# Patient Record
Sex: MALE | Race: WHITE | Employment: OTHER | URBAN - METROPOLITAN AREA
[De-identification: names, ages, dates, MRNs, and addresses within clinical notes are randomized per-mention and may not be internally consistent; named-entity substitution may affect disease eponyms.]

---

## 2017-01-19 ENCOUNTER — OFFICE VISIT (OUTPATIENT)
Dept: FAMILY MEDICINE CLINIC | Age: 72
End: 2017-01-19

## 2017-01-19 VITALS
WEIGHT: 194.4 LBS | BODY MASS INDEX: 25.76 KG/M2 | SYSTOLIC BLOOD PRESSURE: 120 MMHG | DIASTOLIC BLOOD PRESSURE: 70 MMHG | HEIGHT: 73 IN

## 2017-01-19 DIAGNOSIS — E78.2 MIXED HYPERLIPIDEMIA: ICD-10-CM

## 2017-01-19 DIAGNOSIS — Z00.00 HEALTHCARE MAINTENANCE: ICD-10-CM

## 2017-01-19 DIAGNOSIS — R00.2 PALPITATIONS: Primary | ICD-10-CM

## 2017-01-19 PROCEDURE — 4040F PNEUMOC VAC/ADMIN/RCVD: CPT | Performed by: FAMILY MEDICINE

## 2017-01-19 PROCEDURE — 3017F COLORECTAL CA SCREEN DOC REV: CPT | Performed by: FAMILY MEDICINE

## 2017-01-19 PROCEDURE — G8420 CALC BMI NORM PARAMETERS: HCPCS | Performed by: FAMILY MEDICINE

## 2017-01-19 PROCEDURE — G8484 FLU IMMUNIZE NO ADMIN: HCPCS | Performed by: FAMILY MEDICINE

## 2017-01-19 PROCEDURE — G8427 DOCREV CUR MEDS BY ELIG CLIN: HCPCS | Performed by: FAMILY MEDICINE

## 2017-01-19 PROCEDURE — 1036F TOBACCO NON-USER: CPT | Performed by: FAMILY MEDICINE

## 2017-01-19 PROCEDURE — 1123F ACP DISCUSS/DSCN MKR DOCD: CPT | Performed by: FAMILY MEDICINE

## 2017-01-19 PROCEDURE — 99213 OFFICE O/P EST LOW 20 MIN: CPT | Performed by: FAMILY MEDICINE

## 2017-01-19 RX ORDER — METOPROLOL TARTRATE 50 MG/1
TABLET, FILM COATED ORAL
Qty: 90 TABLET | Refills: 3 | Status: SHIPPED | OUTPATIENT
Start: 2017-01-19 | End: 2018-01-22 | Stop reason: SDUPTHER

## 2017-01-19 ASSESSMENT — ENCOUNTER SYMPTOMS
BLOOD IN STOOL: 0
TROUBLE SWALLOWING: 0
SORE THROAT: 0
DIARRHEA: 0
CONSTIPATION: 0
WHEEZING: 0
ABDOMINAL PAIN: 0
SHORTNESS OF BREATH: 0
CHEST TIGHTNESS: 0

## 2017-01-19 ASSESSMENT — PATIENT HEALTH QUESTIONNAIRE - PHQ9
1. LITTLE INTEREST OR PLEASURE IN DOING THINGS: 1
SUM OF ALL RESPONSES TO PHQ QUESTIONS 1-9: 2
SUM OF ALL RESPONSES TO PHQ9 QUESTIONS 1 & 2: 2
2. FEELING DOWN, DEPRESSED OR HOPELESS: 1

## 2018-01-04 ENCOUNTER — TELEPHONE (OUTPATIENT)
Dept: FAMILY MEDICINE CLINIC | Age: 73
End: 2018-01-04

## 2018-01-17 ENCOUNTER — NURSE ONLY (OUTPATIENT)
Dept: FAMILY MEDICINE CLINIC | Age: 73
End: 2018-01-17

## 2018-01-17 DIAGNOSIS — Z00.00 ANNUAL PHYSICAL EXAM: Primary | ICD-10-CM

## 2018-01-17 LAB
A/G RATIO: 1.7 (ref 1.1–2.2)
ALBUMIN SERPL-MCNC: 4.4 G/DL (ref 3.4–5)
ALP BLD-CCNC: 60 U/L (ref 40–129)
ALT SERPL-CCNC: 14 U/L (ref 10–40)
ANION GAP SERPL CALCULATED.3IONS-SCNC: 11 MMOL/L (ref 3–16)
AST SERPL-CCNC: 17 U/L (ref 15–37)
BILIRUB SERPL-MCNC: 0.8 MG/DL (ref 0–1)
BILIRUBIN, POC: NORMAL
BLOOD URINE, POC: NORMAL
BUN BLDV-MCNC: 16 MG/DL (ref 7–20)
CALCIUM SERPL-MCNC: 9.4 MG/DL (ref 8.3–10.6)
CHLORIDE BLD-SCNC: 100 MMOL/L (ref 99–110)
CHOLESTEROL, TOTAL: 217 MG/DL (ref 0–199)
CLARITY, POC: NORMAL
CO2: 29 MMOL/L (ref 21–32)
COLOR, POC: NORMAL
CREAT SERPL-MCNC: 1 MG/DL (ref 0.8–1.3)
GFR AFRICAN AMERICAN: >60
GFR NON-AFRICAN AMERICAN: >60
GLOBULIN: 2.6 G/DL
GLUCOSE BLD-MCNC: 90 MG/DL (ref 70–99)
GLUCOSE URINE, POC: NORMAL
HCT VFR BLD CALC: 46.5 % (ref 40.5–52.5)
HDLC SERPL-MCNC: 59 MG/DL (ref 40–60)
HEMOGLOBIN: 15.7 G/DL (ref 13.5–17.5)
HEPATITIS C ANTIBODY INTERPRETATION: NORMAL
KETONES, POC: NORMAL
LDL CHOLESTEROL CALCULATED: 138 MG/DL
LEUKOCYTE EST, POC: NORMAL
MCH RBC QN AUTO: 29.8 PG (ref 26–34)
MCHC RBC AUTO-ENTMCNC: 33.8 G/DL (ref 31–36)
MCV RBC AUTO: 88.1 FL (ref 80–100)
NITRITE, POC: NORMAL
PDW BLD-RTO: 13.8 % (ref 12.4–15.4)
PH, POC: 6
PLATELET # BLD: 179 K/UL (ref 135–450)
PMV BLD AUTO: 7.4 FL (ref 5–10.5)
POTASSIUM SERPL-SCNC: 5.3 MMOL/L (ref 3.5–5.1)
PROSTATE SPECIFIC ANTIGEN: 0.6 NG/ML (ref 0–4)
PROTEIN, POC: NORMAL
RBC # BLD: 5.28 M/UL (ref 4.2–5.9)
SODIUM BLD-SCNC: 140 MMOL/L (ref 136–145)
SPECIFIC GRAVITY, POC: 1.02
TOTAL PROTEIN: 7 G/DL (ref 6.4–8.2)
TRIGL SERPL-MCNC: 100 MG/DL (ref 0–150)
UROBILINOGEN, POC: 0.2
VLDLC SERPL CALC-MCNC: 20 MG/DL
WBC # BLD: 4.7 K/UL (ref 4–11)

## 2018-01-17 PROCEDURE — 36415 COLL VENOUS BLD VENIPUNCTURE: CPT | Performed by: FAMILY MEDICINE

## 2018-01-17 PROCEDURE — 81002 URINALYSIS NONAUTO W/O SCOPE: CPT | Performed by: FAMILY MEDICINE

## 2018-01-22 ENCOUNTER — OFFICE VISIT (OUTPATIENT)
Dept: FAMILY MEDICINE CLINIC | Age: 73
End: 2018-01-22

## 2018-01-22 VITALS
WEIGHT: 190 LBS | SYSTOLIC BLOOD PRESSURE: 126 MMHG | DIASTOLIC BLOOD PRESSURE: 76 MMHG | HEIGHT: 73 IN | BODY MASS INDEX: 25.18 KG/M2

## 2018-01-22 DIAGNOSIS — M17.11 PRIMARY OSTEOARTHRITIS OF RIGHT KNEE: ICD-10-CM

## 2018-01-22 DIAGNOSIS — R00.2 PALPITATIONS: ICD-10-CM

## 2018-01-22 DIAGNOSIS — G47.33 OSA (OBSTRUCTIVE SLEEP APNEA): ICD-10-CM

## 2018-01-22 DIAGNOSIS — E78.2 MIXED HYPERLIPIDEMIA: Primary | ICD-10-CM

## 2018-01-22 DIAGNOSIS — Z12.11 SCREEN FOR COLON CANCER: ICD-10-CM

## 2018-01-22 PROCEDURE — 99214 OFFICE O/P EST MOD 30 MIN: CPT | Performed by: FAMILY MEDICINE

## 2018-01-22 RX ORDER — METOPROLOL TARTRATE 50 MG/1
TABLET, FILM COATED ORAL
Qty: 90 TABLET | Refills: 3 | Status: SHIPPED | OUTPATIENT
Start: 2018-01-22 | End: 2019-01-17 | Stop reason: SDUPTHER

## 2018-01-22 ASSESSMENT — PATIENT HEALTH QUESTIONNAIRE - PHQ9
2. FEELING DOWN, DEPRESSED OR HOPELESS: 0
1. LITTLE INTEREST OR PLEASURE IN DOING THINGS: 0
SUM OF ALL RESPONSES TO PHQ QUESTIONS 1-9: 0
SUM OF ALL RESPONSES TO PHQ9 QUESTIONS 1 & 2: 0

## 2018-01-22 ASSESSMENT — ENCOUNTER SYMPTOMS
WHEEZING: 0
DIARRHEA: 0
CONSTIPATION: 0
ABDOMINAL PAIN: 0
SORE THROAT: 0
CHEST TIGHTNESS: 0
TROUBLE SWALLOWING: 0
BLOOD IN STOOL: 0
SHORTNESS OF BREATH: 0

## 2018-01-22 NOTE — PROGRESS NOTES
Subjective:      Patient ID: Ni Thompson is a 81 y.o. male. HPI  Health maintenance exam  Palpitation well controllled aith meds   cont to be stressed with current family stucture       Adopted grandson with some psychosocial issues  Retired   denies chest poain or sob  No abd pain     No colonoscopy yet either    Concerns with fareed    por sleep and no feeling of rested after nnightrs sleep   been concerned for fareed for some time    Min snorring    No obesity    Right knee pain with weight bearing    No recent injury    No Known Allergies  Current Outpatient Prescriptions   Medication Sig      metoprolol tartrate (LOPRESSOR) 50 MG tablet TAKE ONE TABLET BY MOUTH DAILY      neomycin-polymyxin-hydrocortisone (CORTISPORIN) 3.5-57830-5 otic solution Place 3 drops into the left ear 3 times daily      triamcinolone (KENALOG) 0.1 % cream Apply topically 2 times daily. No current facility-administered medications for this visit. No past medical history on file. No past surgical history on file. Social History   Substance Use Topics    Smoking status: Never Smoker    Smokeless tobacco: Never Used    Alcohol use No     No family history on file. Review of Systems   Constitutional: Negative for fatigue and unexpected weight change. HENT: Negative for congestion, postnasal drip, sore throat and trouble swallowing. Eyes: Negative for visual disturbance. Respiratory: Negative for chest tightness, shortness of breath and wheezing. Cardiovascular: Positive for palpitations. Negative for chest pain. Gastrointestinal: Negative for abdominal pain, blood in stool, constipation and diarrhea. Genitourinary: Negative for difficulty urinating, frequency and urgency. Musculoskeletal: Negative for arthralgias and joint swelling. Neurological: Negative for dizziness, weakness and numbness. Hematological: Negative for adenopathy. Does not bruise/bleed easily.    Psychiatric/Behavioral: Negative for sleep disturbance. The patient is nervous/anxious. A complete 14 point  review of systems was completed; pertinent positives are noted in HPI    Vitals:    01/22/18 1003   BP: 126/76   Weight: 190 lb (86.2 kg)   Height: 6' 1\" (1.854 m)     Body mass index is 25.07 kg/m². Wt Readings from Last 3 Encounters:   01/22/18 190 lb (86.2 kg)   01/19/17 194 lb 6.4 oz (88.2 kg)   12/28/15 189 lb (85.7 kg)     BP Readings from Last 3 Encounters:   01/22/18 126/76   01/19/17 120/70   12/28/15 100/60        /76   Ht 6' 1\" (1.854 m)   Wt 190 lb (86.2 kg)   BMI 25.07 kg/m²    Objective:   Physical Exam   Constitutional: He is oriented to person, place, and time. He appears well-developed. No distress. HENT:   Right Ear: External ear normal.   Left Ear: External ear normal.   Nose: Nose normal.   Mouth/Throat: Oropharynx is clear and moist.   Eyes: Conjunctivae are normal. No scleral icterus. Neck: Neck supple. No thyromegaly present. Cardiovascular: Normal rate, regular rhythm, normal heart sounds and intact distal pulses. No murmur heard. Pulmonary/Chest: Effort normal and breath sounds normal. No respiratory distress. Abdominal: Soft. Bowel sounds are normal. There is no tenderness. Musculoskeletal: He exhibits no edema. Right knee medial joint line pain    painwith gapping knee medially as well  No effusion or swwelling   Lymphadenopathy:     He has no cervical adenopathy. Neurological: He is alert and oriented to person, place, and time. Skin: Skin is warm. No rash noted. Psychiatric: He has a normal mood and affect. His behavior is normal. Thought content normal.   Mild anxiousness         Assessment:      Assessment/Plan:  Jose Angel Lynn was seen today for medicare awv.     Diagnoses and all orders for this visit:    Healthcare maintenance, good over all health    Mixed hyperlipidemia, mild    Palpitations, well controlled with lopressor  -     metoprolol tartrate (LOPRESSOR) 50 MG tablet; TAKE ONE TABLET BY MOUTH DAILY    Screen for colon cancer  -     POCT Fecal Immunochemical Test (FIT);  Future    Primary osteoarthritis of right knee  -     XR KNEE RIGHT (3 VIEWS)    PAULINA (obstructive sleep apnea)  -     [facility] -[facility]            Plan:      Reviewed lab results with patient in detail, both normal and abnormal and recommended plan of care  Discussed need for colon cancer screening  Xray knee for djd  Refer sleep studies for poss paulina vs anxiety issues  Cont current meds   rto annually

## 2018-02-08 ENCOUNTER — HOSPITAL ENCOUNTER (OUTPATIENT)
Dept: OTHER | Age: 73
Discharge: HOME | End: 2018-02-08
Attending: FAMILY MEDICINE | Admitting: FAMILY MEDICINE

## 2018-02-08 DIAGNOSIS — M17.11 ARTHRITIS OF KNEE, RIGHT: ICD-10-CM

## 2018-02-08 LAB
CONTROL: NORMAL
HEMOCCULT STL QL: NEGATIVE

## 2018-02-08 PROCEDURE — 82274 ASSAY TEST FOR BLOOD FECAL: CPT | Performed by: FAMILY MEDICINE

## 2019-01-17 ENCOUNTER — OFFICE VISIT (OUTPATIENT)
Dept: FAMILY MEDICINE CLINIC | Age: 74
End: 2019-01-17
Payer: MEDICARE

## 2019-01-17 VITALS
HEIGHT: 73 IN | DIASTOLIC BLOOD PRESSURE: 70 MMHG | WEIGHT: 195.4 LBS | BODY MASS INDEX: 25.9 KG/M2 | SYSTOLIC BLOOD PRESSURE: 112 MMHG

## 2019-01-17 DIAGNOSIS — R00.2 PALPITATIONS: ICD-10-CM

## 2019-01-17 DIAGNOSIS — Z12.11 SCREEN FOR COLON CANCER: ICD-10-CM

## 2019-01-17 DIAGNOSIS — E78.2 MIXED HYPERLIPIDEMIA: Primary | ICD-10-CM

## 2019-01-17 DIAGNOSIS — N40.1 BPH WITH OBSTRUCTION/LOWER URINARY TRACT SYMPTOMS: ICD-10-CM

## 2019-01-17 DIAGNOSIS — N13.8 BPH WITH OBSTRUCTION/LOWER URINARY TRACT SYMPTOMS: ICD-10-CM

## 2019-01-17 LAB
A/G RATIO: 1.8 (ref 1.1–2.2)
ALBUMIN SERPL-MCNC: 4.4 G/DL (ref 3.4–5)
ALP BLD-CCNC: 65 U/L (ref 40–129)
ALT SERPL-CCNC: 11 U/L (ref 10–40)
ANION GAP SERPL CALCULATED.3IONS-SCNC: 16 MMOL/L (ref 3–16)
AST SERPL-CCNC: 16 U/L (ref 15–37)
BILIRUB SERPL-MCNC: 0.7 MG/DL (ref 0–1)
BUN BLDV-MCNC: 17 MG/DL (ref 7–20)
CALCIUM SERPL-MCNC: 9.5 MG/DL (ref 8.3–10.6)
CHLORIDE BLD-SCNC: 101 MMOL/L (ref 99–110)
CHOLESTEROL, TOTAL: 209 MG/DL (ref 0–199)
CO2: 26 MMOL/L (ref 21–32)
CREAT SERPL-MCNC: 1.1 MG/DL (ref 0.8–1.3)
GFR AFRICAN AMERICAN: >60
GFR NON-AFRICAN AMERICAN: >60
GLOBULIN: 2.5 G/DL
GLUCOSE BLD-MCNC: 90 MG/DL (ref 70–99)
HCT VFR BLD CALC: 46.6 % (ref 40.5–52.5)
HDLC SERPL-MCNC: 47 MG/DL (ref 40–60)
HEMOGLOBIN: 15.8 G/DL (ref 13.5–17.5)
LDL CHOLESTEROL CALCULATED: 137 MG/DL
MCH RBC QN AUTO: 30.2 PG (ref 26–34)
MCHC RBC AUTO-ENTMCNC: 33.9 G/DL (ref 31–36)
MCV RBC AUTO: 89.1 FL (ref 80–100)
PDW BLD-RTO: 14.1 % (ref 12.4–15.4)
PLATELET # BLD: 202 K/UL (ref 135–450)
PMV BLD AUTO: 7.3 FL (ref 5–10.5)
POTASSIUM SERPL-SCNC: 5 MMOL/L (ref 3.5–5.1)
PROSTATE SPECIFIC ANTIGEN: 0.53 NG/ML (ref 0–4)
RBC # BLD: 5.23 M/UL (ref 4.2–5.9)
SODIUM BLD-SCNC: 143 MMOL/L (ref 136–145)
TOTAL PROTEIN: 6.9 G/DL (ref 6.4–8.2)
TRIGL SERPL-MCNC: 124 MG/DL (ref 0–150)
TSH REFLEX: 2.47 UIU/ML (ref 0.27–4.2)
VLDLC SERPL CALC-MCNC: 25 MG/DL
WBC # BLD: 4.8 K/UL (ref 4–11)

## 2019-01-17 PROCEDURE — 1101F PT FALLS ASSESS-DOCD LE1/YR: CPT | Performed by: FAMILY MEDICINE

## 2019-01-17 PROCEDURE — 4040F PNEUMOC VAC/ADMIN/RCVD: CPT | Performed by: FAMILY MEDICINE

## 2019-01-17 PROCEDURE — 3017F COLORECTAL CA SCREEN DOC REV: CPT | Performed by: FAMILY MEDICINE

## 2019-01-17 PROCEDURE — G8427 DOCREV CUR MEDS BY ELIG CLIN: HCPCS | Performed by: FAMILY MEDICINE

## 2019-01-17 PROCEDURE — 1036F TOBACCO NON-USER: CPT | Performed by: FAMILY MEDICINE

## 2019-01-17 PROCEDURE — 1123F ACP DISCUSS/DSCN MKR DOCD: CPT | Performed by: FAMILY MEDICINE

## 2019-01-17 PROCEDURE — 99213 OFFICE O/P EST LOW 20 MIN: CPT | Performed by: FAMILY MEDICINE

## 2019-01-17 PROCEDURE — G8419 CALC BMI OUT NRM PARAM NOF/U: HCPCS | Performed by: FAMILY MEDICINE

## 2019-01-17 PROCEDURE — G8484 FLU IMMUNIZE NO ADMIN: HCPCS | Performed by: FAMILY MEDICINE

## 2019-01-17 PROCEDURE — 36415 COLL VENOUS BLD VENIPUNCTURE: CPT | Performed by: FAMILY MEDICINE

## 2019-01-17 RX ORDER — METOPROLOL TARTRATE 50 MG/1
TABLET, FILM COATED ORAL
Qty: 90 TABLET | Refills: 3 | Status: SHIPPED | OUTPATIENT
Start: 2019-01-17 | End: 2020-01-07

## 2019-01-17 ASSESSMENT — ENCOUNTER SYMPTOMS
WHEEZING: 0
ABDOMINAL PAIN: 0
SHORTNESS OF BREATH: 0
EYE PAIN: 0
COUGH: 0

## 2019-01-17 ASSESSMENT — PATIENT HEALTH QUESTIONNAIRE - PHQ9
SUM OF ALL RESPONSES TO PHQ QUESTIONS 1-9: 0
2. FEELING DOWN, DEPRESSED OR HOPELESS: 0
SUM OF ALL RESPONSES TO PHQ9 QUESTIONS 1 & 2: 0
1. LITTLE INTEREST OR PLEASURE IN DOING THINGS: 0
SUM OF ALL RESPONSES TO PHQ QUESTIONS 1-9: 0

## 2019-03-25 DIAGNOSIS — Z12.11 SCREEN FOR COLON CANCER: ICD-10-CM

## 2019-03-25 LAB
CONTROL: NORMAL
HEMOCCULT STL QL: NORMAL

## 2019-03-25 PROCEDURE — 82274 ASSAY TEST FOR BLOOD FECAL: CPT | Performed by: FAMILY MEDICINE

## 2020-01-07 RX ORDER — METOPROLOL TARTRATE 50 MG/1
TABLET, FILM COATED ORAL
Qty: 90 TABLET | Refills: 2 | Status: SHIPPED | OUTPATIENT
Start: 2020-01-07 | End: 2020-09-25

## 2020-09-25 RX ORDER — METOPROLOL TARTRATE 50 MG/1
TABLET, FILM COATED ORAL
Qty: 90 TABLET | Refills: 1 | Status: SHIPPED | OUTPATIENT
Start: 2020-09-25 | End: 2021-04-06

## 2021-04-06 DIAGNOSIS — R00.2 PALPITATIONS: ICD-10-CM

## 2021-04-06 RX ORDER — METOPROLOL TARTRATE 50 MG/1
TABLET, FILM COATED ORAL
Qty: 90 TABLET | Refills: 0 | Status: SHIPPED | OUTPATIENT
Start: 2021-04-06 | End: 2021-06-28

## 2021-04-15 ENCOUNTER — OFFICE VISIT (OUTPATIENT)
Dept: FAMILY MEDICINE CLINIC | Age: 76
End: 2021-04-15
Payer: COMMERCIAL

## 2021-04-15 VITALS
HEIGHT: 73 IN | WEIGHT: 205 LBS | BODY MASS INDEX: 27.17 KG/M2 | SYSTOLIC BLOOD PRESSURE: 124 MMHG | DIASTOLIC BLOOD PRESSURE: 66 MMHG

## 2021-04-15 DIAGNOSIS — E78.2 MIXED HYPERLIPIDEMIA: ICD-10-CM

## 2021-04-15 DIAGNOSIS — R00.2 PALPITATIONS: Primary | ICD-10-CM

## 2021-04-15 DIAGNOSIS — U07.1 COVID-19 VIRUS INFECTION: ICD-10-CM

## 2021-04-15 DIAGNOSIS — Z76.89 ENCOUNTER TO ESTABLISH CARE: ICD-10-CM

## 2021-04-15 DIAGNOSIS — Z12.11 COLON CANCER SCREENING: ICD-10-CM

## 2021-04-15 LAB
A/G RATIO: 1.8 (ref 1.1–2.2)
ALBUMIN SERPL-MCNC: 4.4 G/DL (ref 3.4–5)
ALP BLD-CCNC: 62 U/L (ref 40–129)
ALT SERPL-CCNC: 11 U/L (ref 10–40)
ANION GAP SERPL CALCULATED.3IONS-SCNC: 11 MMOL/L (ref 3–16)
AST SERPL-CCNC: 16 U/L (ref 15–37)
BILIRUB SERPL-MCNC: 0.8 MG/DL (ref 0–1)
BUN BLDV-MCNC: 18 MG/DL (ref 7–20)
CALCIUM SERPL-MCNC: 9.2 MG/DL (ref 8.3–10.6)
CHLORIDE BLD-SCNC: 102 MMOL/L (ref 99–110)
CHOLESTEROL, TOTAL: 208 MG/DL (ref 0–199)
CO2: 27 MMOL/L (ref 21–32)
CREAT SERPL-MCNC: 1.3 MG/DL (ref 0.8–1.3)
GFR AFRICAN AMERICAN: >60
GFR NON-AFRICAN AMERICAN: 54
GLOBULIN: 2.4 G/DL
GLUCOSE BLD-MCNC: 83 MG/DL (ref 70–99)
HCT VFR BLD CALC: 45.1 % (ref 40.5–52.5)
HDLC SERPL-MCNC: 37 MG/DL (ref 40–60)
HEMOGLOBIN: 15.5 G/DL (ref 13.5–17.5)
LDL CHOLESTEROL CALCULATED: 135 MG/DL
MCH RBC QN AUTO: 29.8 PG (ref 26–34)
MCHC RBC AUTO-ENTMCNC: 34.4 G/DL (ref 31–36)
MCV RBC AUTO: 86.6 FL (ref 80–100)
PDW BLD-RTO: 14.3 % (ref 12.4–15.4)
PLATELET # BLD: 146 K/UL (ref 135–450)
PMV BLD AUTO: 7.1 FL (ref 5–10.5)
POTASSIUM SERPL-SCNC: 4 MMOL/L (ref 3.5–5.1)
RBC # BLD: 5.21 M/UL (ref 4.2–5.9)
SARS-COV-2 ANTIBODY, TOTAL: NEGATIVE
SODIUM BLD-SCNC: 140 MMOL/L (ref 136–145)
TOTAL PROTEIN: 6.8 G/DL (ref 6.4–8.2)
TRIGL SERPL-MCNC: 180 MG/DL (ref 0–150)
TSH REFLEX FT4: 1.78 UIU/ML (ref 0.27–4.2)
VLDLC SERPL CALC-MCNC: 36 MG/DL
WBC # BLD: 5.1 K/UL (ref 4–11)

## 2021-04-15 PROCEDURE — 93000 ELECTROCARDIOGRAM COMPLETE: CPT | Performed by: FAMILY MEDICINE

## 2021-04-15 PROCEDURE — 99214 OFFICE O/P EST MOD 30 MIN: CPT | Performed by: FAMILY MEDICINE

## 2021-04-15 ASSESSMENT — PATIENT HEALTH QUESTIONNAIRE - PHQ9
SUM OF ALL RESPONSES TO PHQ QUESTIONS 1-9: 0

## 2021-04-15 NOTE — PROGRESS NOTES
4/15/2021    Magdalene Monaco (:  1945) is a 78 y.o. male, here for evaluation of the following medical concerns:    HPI      Encounter to establish care- patient presented to the clinic to establish care with a new pcp. The patient's previous pcp is retired. The patient is feeling well today and denied a new problem. he has several chronic medical conditions to discuss today. he denied tobacco use, alcohol use, or drug use. Palpitations- patient has been on medication for years,has \"skipped beats\", does have dyspnea and fatigue with no chest pain at this times, stable today, taking Lopressor as prescribed, stated had similar symptoms years ago and was started on the medication. Hyperlipidemia- patient has hx of elevated cholesterol     Today, chest pain, sob,n , v, or diarrhea. Review of Systems   Constitutional: Negative for activity change, fatigue, fever and unexpected weight change. HENT: Negative for congestion, ear pain, rhinorrhea, sinus pressure and sore throat. Respiratory: Negative for cough and shortness of breath. Cardiovascular: Positive for palpitations. Negative for chest pain and leg swelling. Gastrointestinal: Negative for abdominal pain, diarrhea, nausea and vomiting. Endocrine: Negative for cold intolerance, heat intolerance, polydipsia and polyphagia. Musculoskeletal: Negative for arthralgias. Skin: Negative for rash. Neurological: Negative for dizziness, light-headedness and headaches. Psychiatric/Behavioral: Negative for dysphoric mood. The patient is not nervous/anxious. Prior to Visit Medications    Medication Sig Taking? Authorizing Provider   metoprolol tartrate (LOPRESSOR) 50 MG tablet TAKE ONE TABLET BY MOUTH DAILY Yes Diego Keys DO        No Known Allergies    No past medical history on file. No past surgical history on file.     Social History     Socioeconomic History    Marital status:      Spouse name: Not on file  Number of children: Not on file    Years of education: Not on file    Highest education level: Not on file   Occupational History    Not on file   Social Needs    Financial resource strain: Not on file    Food insecurity     Worry: Not on file     Inability: Not on file    Transportation needs     Medical: Not on file     Non-medical: Not on file   Tobacco Use    Smoking status: Never Smoker    Smokeless tobacco: Never Used   Substance and Sexual Activity    Alcohol use: No    Drug use: No    Sexual activity: Yes     Partners: Female   Lifestyle    Physical activity     Days per week: Not on file     Minutes per session: Not on file    Stress: Not on file   Relationships    Social connections     Talks on phone: Not on file     Gets together: Not on file     Attends Yazidism service: Not on file     Active member of club or organization: Not on file     Attends meetings of clubs or organizations: Not on file     Relationship status: Not on file    Intimate partner violence     Fear of current or ex partner: Not on file     Emotionally abused: Not on file     Physically abused: Not on file     Forced sexual activity: Not on file   Other Topics Concern    Not on file   Social History Narrative    Not on file        No family history on file. Vitals:    04/15/21 1115   BP: 124/66   Weight: 205 lb (93 kg)   Height: 6' 1\" (1.854 m)     Estimated body mass index is 27.05 kg/m² as calculated from the following:    Height as of this encounter: 6' 1\" (1.854 m). Weight as of this encounter: 205 lb (93 kg).       Chemistry        Component Value Date/Time     04/15/2021 1157    K 4.0 04/15/2021 1157     04/15/2021 1157    CO2 27 04/15/2021 1157    BUN 18 04/15/2021 1157    CREATININE 1.3 04/15/2021 1157        Component Value Date/Time    CALCIUM 9.2 04/15/2021 1157    ALKPHOS 62 04/15/2021 1157    AST 16 04/15/2021 1157    ALT 11 04/15/2021 1157    BILITOT 0.8 04/15/2021 1157 Lab Results   Component Value Date    WBC 5.1 04/15/2021    HGB 15.5 04/15/2021    HCT 45.1 04/15/2021    MCV 86.6 04/15/2021     04/15/2021     No results found for: LABA1C  No results found for: EAG  No results found for: LABA1C  No components found for: CHLPL  Lab Results   Component Value Date    TRIG 180 (H) 04/15/2021    TRIG 124 01/17/2019    TRIG 100 01/17/2018     Lab Results   Component Value Date    HDL 37 (L) 04/15/2021    HDL 47 01/17/2019    HDL 59 01/17/2018     Lab Results   Component Value Date    LDLCALC 135 (H) 04/15/2021    LDLCALC 137 (H) 01/17/2019    LDLCALC 138 (H) 01/17/2018     Lab Results   Component Value Date    LABVLDL 36 04/15/2021    LABVLDL 25 01/17/2019    LABVLDL 20 01/17/2018       Physical Exam  Vitals signs and nursing note reviewed. Constitutional:       Appearance: He is well-developed. HENT:      Head: Normocephalic and atraumatic. Right Ear: External ear normal.      Left Ear: External ear normal.   Neck:      Thyroid: No thyromegaly. Cardiovascular:      Rate and Rhythm: Normal rate and regular rhythm. Heart sounds: No murmur. Pulmonary:      Effort: Pulmonary effort is normal.      Breath sounds: Normal breath sounds. No wheezing or rales. Abdominal:      General: Bowel sounds are normal.      Palpations: Abdomen is soft. Tenderness: There is no abdominal tenderness. Musculoskeletal: Normal range of motion. Skin:     Findings: No rash. Neurological:      Mental Status: He is alert and oriented to person, place, and time. Psychiatric:         Behavior: Behavior normal.         ASSESSMENT/PLAN:  1. Encounter to establish care  Care established  Medications reviewed  Questions answered  Labs obtained  RTC in three months for follow up. 2. Palpitations  EKG showed first degree block  Labs will be obtained. Referred for procedure  Educated on the importance of having this done.    Answered questions  - EKG 12 Lead  - CBC  - Comprehensive Metabolic Panel  - TSH WITH REFLEX TO FT4  - Lipid Panel  - Holter Monitor 48 Hour; Future    3. Mixed hyperlipidemia  Take medication as prescribed. Discussed the need to exercise 30 minutes each day. Monitor diet, avoid fried foods etc... Educated on need to reduce cholesterol in diet and results of poor diet. - CBC  - Comprehensive Metabolic Panel  - TSH WITH REFLEX TO FT4  - Lipid Panel    4. COVID-19 virus infection  Lab need  Educated on the importance of having this done. Answered questions  - Covid-19, Antibody, Total    5. Colon cancer screening  Referred for procedure  Educated on the importance of having this done. Answered questions  Return in about 3 months (around 7/15/2021).

## 2021-04-20 ASSESSMENT — ENCOUNTER SYMPTOMS
SORE THROAT: 0
DIARRHEA: 0
SINUS PRESSURE: 0
NAUSEA: 0
ABDOMINAL PAIN: 0
RHINORRHEA: 0
VOMITING: 0
COUGH: 0
SHORTNESS OF BREATH: 0

## 2021-06-15 ENCOUNTER — HOSPITAL ENCOUNTER (OUTPATIENT)
Dept: NON INVASIVE DIAGNOSTICS | Age: 76
Discharge: HOME | End: 2021-06-15
Payer: MEDICARE

## 2021-06-15 DIAGNOSIS — R00.2 PALPITATIONS: ICD-10-CM

## 2021-06-15 PROCEDURE — 93225 XTRNL ECG REC<48 HRS REC: CPT

## 2021-06-15 PROCEDURE — 93226 XTRNL ECG REC<48 HR SCAN A/R: CPT

## 2021-06-17 LAB
ACQUISITION DURATION: NORMAL S
ACQUISITION DURATION: NORMAL S
AVERAGE HEART RATE: 64 BPM
AVERAGE HEART RATE: 66 BPM
EKG DIAGNOSIS: NORMAL
EKG DIAGNOSIS: NORMAL
HOLTER MAX HEART RATE: 110 BPM
HOLTER MAX HEART RATE: 90 BPM
HOOKUP DATE: NORMAL
HOOKUP DATE: NORMAL
HOOKUP TIME: NORMAL
HOOKUP TIME: NORMAL
MAX HEART RATE TIME/DATE: NORMAL
MAX HEART RATE TIME/DATE: NORMAL
MIN HEART RATE TIME/DATE: NORMAL
MIN HEART RATE TIME/DATE: NORMAL
MIN HEART RATE: 45 BPM
MIN HEART RATE: 46 BPM
NUMBER OF QRS COMPLEXES: NORMAL
NUMBER OF QRS COMPLEXES: NORMAL
NUMBER OF SUPRAVENTRICULAR COUPLETS: 0
NUMBER OF SUPRAVENTRICULAR COUPLETS: 0
NUMBER OF SUPRAVENTRICULAR ECTOPICS: 250
NUMBER OF SUPRAVENTRICULAR ECTOPICS: 462
NUMBER OF SUPRAVENTRICULAR ISOLATED BEATS: 230
NUMBER OF SUPRAVENTRICULAR ISOLATED BEATS: 343
NUMBER OF VENTRICULAR BIGEMINAL CYCLES: 382
NUMBER OF VENTRICULAR BIGEMINAL CYCLES: 393
NUMBER OF VENTRICULAR COUPLETS: 103
NUMBER OF VENTRICULAR COUPLETS: 175
NUMBER OF VENTRICULAR ECTOPICS: NORMAL
NUMBER OF VENTRICULAR ECTOPICS: NORMAL

## 2021-06-28 DIAGNOSIS — R00.2 PALPITATIONS: ICD-10-CM

## 2021-06-28 RX ORDER — METOPROLOL TARTRATE 50 MG/1
TABLET, FILM COATED ORAL
Qty: 90 TABLET | Refills: 0 | Status: SHIPPED | OUTPATIENT
Start: 2021-06-28 | End: 2021-09-24

## 2021-07-01 ENCOUNTER — TELEPHONE (OUTPATIENT)
Dept: FAMILY MEDICINE CLINIC | Age: 76
End: 2021-07-01

## 2021-07-01 DIAGNOSIS — G47.30 SLEEP APNEA, UNSPECIFIED TYPE: Primary | ICD-10-CM

## 2021-07-01 NOTE — TELEPHONE ENCOUNTER
Pt called to get results from the Holter monitor and see what are the next steps.  Please give pt a call [phone]

## 2021-07-22 ENCOUNTER — OFFICE VISIT (OUTPATIENT)
Dept: SLEEP MEDICINE | Age: 76
End: 2021-07-22
Payer: COMMERCIAL

## 2021-07-22 VITALS
TEMPERATURE: 96.9 F | OXYGEN SATURATION: 96 % | WEIGHT: 201.4 LBS | RESPIRATION RATE: 18 BRPM | BODY MASS INDEX: 26.69 KG/M2 | HEART RATE: 56 BPM | HEIGHT: 73 IN | SYSTOLIC BLOOD PRESSURE: 110 MMHG | DIASTOLIC BLOOD PRESSURE: 64 MMHG

## 2021-07-22 DIAGNOSIS — G47.63 BRUXISM, SLEEP-RELATED: ICD-10-CM

## 2021-07-22 DIAGNOSIS — G47.33 OSA (OBSTRUCTIVE SLEEP APNEA): Primary | ICD-10-CM

## 2021-07-22 DIAGNOSIS — R00.2 PALPITATIONS: ICD-10-CM

## 2021-07-22 PROCEDURE — 99204 OFFICE O/P NEW MOD 45 MIN: CPT | Performed by: PSYCHIATRY & NEUROLOGY

## 2021-07-22 ASSESSMENT — SLEEP AND FATIGUE QUESTIONNAIRES
HOW LIKELY ARE YOU TO NOD OFF OR FALL ASLEEP WHILE SITTING QUIETLY AFTER LUNCH WITHOUT ALCOHOL: 0
HOW LIKELY ARE YOU TO NOD OFF OR FALL ASLEEP IN A CAR, WHILE STOPPED FOR A FEW MINUTES IN TRAFFIC: 0
NECK CIRCUMFERENCE (INCHES): 15.5
HOW LIKELY ARE YOU TO NOD OFF OR FALL ASLEEP WHILE SITTING AND TALKING TO SOMEONE: 0
HOW LIKELY ARE YOU TO NOD OFF OR FALL ASLEEP WHILE SITTING INACTIVE IN A PUBLIC PLACE: 0
HOW LIKELY ARE YOU TO NOD OFF OR FALL ASLEEP WHEN YOU ARE A PASSENGER IN A CAR FOR AN HOUR WITHOUT A BREAK: 0
HOW LIKELY ARE YOU TO NOD OFF OR FALL ASLEEP WHILE SITTING AND READING: 1
ESS TOTAL SCORE: 3
HOW LIKELY ARE YOU TO NOD OFF OR FALL ASLEEP WHILE WATCHING TV: 1
HOW LIKELY ARE YOU TO NOD OFF OR FALL ASLEEP WHILE LYING DOWN TO REST IN THE AFTERNOON WHEN CIRCUMSTANCES PERMIT: 1

## 2021-07-22 ASSESSMENT — ENCOUNTER SYMPTOMS
ALLERGIC/IMMUNOLOGIC NEGATIVE: 1
GASTROINTESTINAL NEGATIVE: 1
APNEA: 1
EYES NEGATIVE: 1

## 2021-07-22 NOTE — PATIENT INSTRUCTIONS
Patient Education        Sleep Apnea: Care Instructions  Overview     Sleep apnea means that you frequently stop breathing for 10 seconds or longer during sleep. It can be mild to severe, based on the number of times an hour that you stop breathing or have slowed breathing. Blocked or narrowed airways in your nose, mouth, or throat can cause sleep apnea. Your airway can become blocked when your throat muscles and tongue relax during sleep. You can help treat sleep apnea at home by making lifestyle changes. You also can use a CPAP breathing machine that keeps tissues in the throat from blocking your airway. Or your doctor may suggest that you use a breathing device while you sleep. It helps keep your airway open. This could be a device that you put in your mouth. In some cases, surgery may be needed to remove enlarged tissues in the throat. Follow-up care is a key part of your treatment and safety. Be sure to make and go to all appointments, and call your doctor if you are having problems. It's also a good idea to know your test results and keep a list of the medicines you take. How can you care for yourself at home? · Lose weight, if needed. · Sleep on your side. It may help mild apnea. · Avoid alcohol and medicines such as sleeping pills, opioids, or sedatives before bed. · Don't smoke. If you need help quitting, talk to your doctor. · Prop up the head of your bed. · Treat breathing problems, such as a stuffy nose, that are caused by a cold or allergies. · Try a continuous positive airway pressure (CPAP) breathing machine if your doctor recommends it. · If CPAP doesn't work for you, ask your doctor if you can try other masks, settings, or breathing machines. · Try oral breathing devices or other nasal devices. · Talk to your doctor if your nose feels dry or bleeds, or if it gets runny or stuffy when you use a breathing machine.   · Tell your doctor if you're sleepy during the day and it affects your daily life. Don't drive or operate machinery when you're drowsy. When should you call for help? Watch closely for changes in your health, and be sure to contact your doctor if:    · You still have sleep apnea even though you have made lifestyle changes.     · You are thinking of trying a device such as CPAP.     · You are having problems using a CPAP or similar machine.     · You are still sleepy during the day, and it affects your daily life. Where can you learn more? Go to https://Thinknum.Informantonline. org and sign in to your [employer] account. Enter E482 in the [employer] box to learn more about \"Sleep Apnea: Care Instructions. \"     If you do not have an account, please click on the \"Sign Up Now\" link. Current as of: October 26, 2020               Content Version: 12.9  © 7819-4076 Healthwise, Incorporated. Care instructions adapted under license by [facility] ([facility]). If you have questions about a medical condition or this instruction, always ask your healthcare professional. [address] any warranty or liability for your use of this information.

## 2021-07-22 NOTE — PROGRESS NOTES
MD LACEY Hinojosa Board Certified in Sleep Medicine  Certified [facility] Sleep Medicine  Board Certified in Neurology Diego Mendez [address] [facility], [address] [place] [address]  [address] [facility], [address]           [address]  [address] [facility] [address] [phone]    Subjective:     Patient ID: Manjinder Spencer is a 78 y.o. male. Chief Complaint   Patient presents with    Sleep Apnea       HPI:        Manjinder Spencer is a 78 y.o. male referred by Freddy Alicia for a sleep evaluation. He complains of snoring, periods of not breathing, tossing and turning, difficulty falling asleep once awakened but he denies snorting, choking, knees buckling with laughing, completely or partially paralyzed while falling asleep or waking up, take naps during the day, noisy environment, uncomfortable room temperature, uncomfortable bedding. Symptoms began several years ago, gradually worsening since that time. The patient's bed-partner confirmed the snoring and stopped breathing at night  SLEEP SCHEDULE: Goes to bed around 10:45 PM in the weekdays and 10:45 PM in the weekends. It usually takes the patient 5-30 minutes to fall asleep. The patient gets up 0-1 per night to go to the bathroom. The Patient finally gets up at 8 AM during the weekdays and 8 AM in the weekends. The Patient scored Total score: 3 on [place] Sleepiness Scale ( more than 10 is indicative of daytime sleepiness)and 30 in fatigue scale ( more than 36 is indicative of daytime fatigue). Previous evaluation and treatment has included- 20 years ago, probably was told he had moderate PAULINA, no machine.   He grinds his teeth during his sleep  DOT/CDL - N/A  FAA/'slicense - N/A  Palpitations EKG showed first degree block    Previous Report(s) Reviewed: historical medical records Social History     Socioeconomic History    Marital status:      Spouse name: Not on file    Number of children: Not on file    Years of education: Not on file    Highest education level: Not on file   Occupational History    Not on file   Tobacco Use    Smoking status: Never Smoker    Smokeless tobacco: Never Used   Substance and Sexual Activity    Alcohol use: Yes     Comment: ONCE A DAY     Drug use: No    Sexual activity: Yes     Partners: Female   Other Topics Concern    Not on file   Social History Narrative    Not on file     Social Determinants of Health     Financial Resource Strain:     Difficulty of Paying Living Expenses:    Food Insecurity:     Worried About Running Out of Food in the Last Year:     [address] in the Last Year:    Transportation Needs:     Lack of Transportation (Medical):  Lack of Transportation (Non-Medical):    Physical Activity:     Days of Exercise per Week:     Minutes of Exercise per Session:    Stress:     Feeling of Stress :    Social Connections:     Frequency of Communication with Friends and Family:     Frequency of Social Gatherings with Friends and Family:     Attends Ep[employer]op[employer] Services:     Active Member of Clubs or Organizations:     Attends Club or Organization Meetings:     Marital Status:    Intimate Partner Violence:     Fear of Current or Ex-Partner:     Emotionally Abused:     Physically Abused:     Sexually Abused:        Prior to Admission medications    Medication Sig Start Date End Date Taking? Authorizing Provider   metoprolol tartrate (LOPRESSOR) 50 MG tablet TAKE ONE TABLET BY MOUTH DAILY 6/28/21   [place] Erps, DO       Allergies as of 07/22/2021    (No Known Allergies)       Patient Active Problem List   Diagnosis    Palpitations    Hyperlipidemia       History reviewed. No pertinent past medical history. History reviewed. No pertinent surgical history. History reviewed.  No pertinent family history. Review of Systems   Constitutional: Negative. HENT: Positive for congestion. Eyes: Negative. Respiratory: Positive for apnea. Cardiovascular: Negative. Negative for leg swelling. Gastrointestinal: Negative. Endocrine: Negative. Genitourinary: Negative for frequency. Musculoskeletal: Negative. Skin: Negative. Allergic/Immunologic: Negative. Neurological: Negative. Negative for headaches. Hematological: Negative. Psychiatric/Behavioral: Positive for dysphoric mood. Objective:     Vitals:  Weight BMI Neck circumference    Wt Readings from Last 3 Encounters:   07/22/21 201 lb 6.4 oz (91.4 kg)   04/15/21 205 lb (93 kg)   01/17/19 195 lb 6.4 oz (88.6 kg)    Body mass index is 26.57 kg/m². Neck circumference: 15.5     BP HR SaO2   BP Readings from Last 3 Encounters:   07/22/21 110/64   04/15/21 124/66   01/17/19 112/70    Pulse Readings from Last 3 Encounters:   07/22/21 56   08/25/12 84    SpO2 Readings from Last 3 Encounters:   07/22/21 96%        The mandibular molar Class :   []1 [x]2 []3 mild      Mallampati I Airway Classification:   []1 []2 [x]3 []4        Physical Exam  Vitals and nursing note reviewed. Constitutional:       Appearance: Normal appearance. HENT:      Head: Atraumatic. Nose: Nose normal.      Mouth/Throat:      Comments: Mallampati class 3, mild retrognathia , normal airflow in bilateral nostrils, no septum deviation , crowded oropharynx with low soft palate, high arched hard palate,no tonsils enlargement. Eyes:      Extraocular Movements: Extraocular movements intact. Cardiovascular:      Rate and Rhythm: Normal rate and regular rhythm. Heart sounds: Normal heart sounds. Pulmonary:      Effort: Pulmonary effort is normal.      Breath sounds: Normal breath sounds. Musculoskeletal:         General: Normal range of motion. Cervical back: Normal range of motion and neck supple. Skin:     General: Skin is warm. Neurological:      General: No focal deficit present. Psychiatric:         Mood and Affect: Mood normal.         Assessment:   Obstructive sleep apnea especially with snoring, observed apnea, daytime sleepiness, mild retrognathia , Mallampati class of 3              Diagnosis Orders   1. PAULINA (obstructive sleep apnea)  Home Sleep Study   2. Bruxism, sleep-related  Home Sleep Study   3. Palpitations  Home Sleep Study     Plan:   Mouth guard for bruxism. Patient was counseled about the pathophysiology of obstructive sleep apnea syndrome and the methods for evaluating its presence and severity. Patient was counseled to avoid driving and other potentially hazardous circumstances if the patient is experiencing excessive sleepiness. Treatment considerations include the use of nasal CPAP, oral dental appliance or a surgical intervention, which should be based on otolarygologic findings, In the meantime, the patient should be cautioned to avoid the use of alcohol or other depressant medications because of potential for increasing the duration and severity of apnea and cautioned regarding driving or operating and dangerous equipment if the patient is experiencing daytime sleepiness. .  Most likely treating the PAULINA will have position impact on palpitation control. Orders Placed This Encounter   Procedures    Home Sleep Study       Return in about 3 months (around 10/22/2021) for Reveiwing CPAP usage and compliance report and tro.     Nallely Celeste MD  Medical Director - [facility]

## 2021-07-30 ENCOUNTER — HOSPITAL ENCOUNTER (OUTPATIENT)
Dept: SLEEP CENTER | Age: 76
Discharge: HOME | End: 2021-07-30
Payer: MEDICARE

## 2021-07-30 DIAGNOSIS — R00.2 PALPITATIONS: ICD-10-CM

## 2021-07-30 DIAGNOSIS — G47.63 BRUXISM, SLEEP-RELATED: ICD-10-CM

## 2021-07-30 DIAGNOSIS — G47.33 OSA (OBSTRUCTIVE SLEEP APNEA): ICD-10-CM

## 2021-07-30 PROCEDURE — 95806 SLEEP STUDY UNATT&RESP EFFT: CPT

## 2021-08-02 PROCEDURE — 95806 SLEEP STUDY UNATT&RESP EFFT: CPT | Performed by: PSYCHIATRY & NEUROLOGY

## 2021-08-02 NOTE — PROGRESS NOTES
Tony Tamayo         : 1945  [] [facility]     [] [address]      [] [facility]     [][facility]    [] [facility]  [] [facility]   [] Other:  Diagnosis: [x] PAULINA (G47.33) [] CSA (G47.31) [] Apnea (G47.30)   Length of Need: [] 12 Months [x] 99 Months [] Other:    Machine (DIOGO!): [] Respironics Dream Station      Auto [x] ResMed AirSense     Auto [] Other:     [x]  CPAP () [] Bilevel ()   Mode: [x] Auto [] Spontaneous    Mode: [] Auto [] Spontaneous           Between 5 and 20 cm                 Comfort Settings:   - Ramp Pressure: 5 cmH2O                                        - Ramp time: 15 min                                     -  Flex/EPR - 3 full time                                    - For ResMed Bilevel (TiMax-4 sec   TiMin- 0.2 sec)     Humidifier: [x] Heated ()        [x] Water chamber replacement ()/ 1 per 6 months        Mask:  Please always start with the mask the patient used during the titraion   [x] Nasal () /1 per 3 months [x] Full Face () /1 per 3 months   [x] Patient choice -Size and fit mask [x] Patient Choice - Size and fit mask   [] Dispense:  [] Dispense:    [x] Headgear () / 1 per 3 months [x] Headgear () / 1 per 3 months   [x] Replacement Nasal Cushion ()/2 per month [x] Interface Replacement ()/1 per month   [x] Replacement Nasal Pillows ()/2 per month         Tubing: [x] Heated ()/1 per 3 months    [] Standard ()/1 per 3 months [] Other:           Filters: [x] Non-disposable ()/1 per 6 months     [x] Ultra-Fine, Disposable ()/2 per month        Miscellaneous: [x] Chin Strap ()/ 1 per 6 months [] O2 bleed-in:       LPM   [] Oximetry on CPAP/Bilevel []  Other:          Start Order Date: 21    MEDICAL JUSTIFICATION:  I, the undersigned, certify that the above prescribed supplies are medically necessary for this patients wellbeing.   In my opinion, the supplies are both reasonable and necessary in reference to accepted standards of medicalpractice in treatment of this patients condition.     Blanchie Mcburney, MD      NPI: [id number]       Order Signed Date: 08/02/21    Electronically signed by Blanchie Mcburney, MD on 8/2/2021 at 3:41 PM

## 2021-08-03 ENCOUNTER — TELEPHONE (OUTPATIENT)
Dept: PULMONOLOGY | Age: 76
End: 2021-08-03

## 2021-08-03 NOTE — TELEPHONE ENCOUNTER
Arlen Shaw would like his Rx faxed to [address]  Order faxed to [facility]  Instructed to call the office the day he is set up on his machine to schedule his f/u appt

## 2021-09-08 ENCOUNTER — TELEPHONE (OUTPATIENT)
Dept: PULMONOLOGY | Age: 76
End: 2021-09-08

## 2021-09-08 NOTE — TELEPHONE ENCOUNTER
Tre calls back. [facility] did not receive our order on 8/3/21. Our fax shows was successfully sent. Please refax order to [facility] and ask to expedite.

## 2021-09-08 NOTE — TELEPHONE ENCOUNTER
Saman Ruggiero calls in. He has yet to hear from [facility] on his new cpap machine. Order faxed on 8/3/2021. Left message with Tre/ [facility] for an update on order.

## 2021-09-24 DIAGNOSIS — R00.2 PALPITATIONS: ICD-10-CM

## 2021-09-24 RX ORDER — METOPROLOL TARTRATE 50 MG/1
TABLET, FILM COATED ORAL
Qty: 90 TABLET | Refills: 0 | Status: SHIPPED | OUTPATIENT
Start: 2021-09-24 | End: 2021-12-22

## 2021-12-21 DIAGNOSIS — R00.2 PALPITATIONS: ICD-10-CM

## 2021-12-22 RX ORDER — METOPROLOL TARTRATE 50 MG/1
TABLET, FILM COATED ORAL
Qty: 90 TABLET | Refills: 0 | Status: SHIPPED | OUTPATIENT
Start: 2021-12-22 | End: 2022-01-19

## 2022-01-07 ENCOUNTER — TELEPHONE (OUTPATIENT)
Dept: FAMILY MEDICINE CLINIC | Age: 77
End: 2022-01-07

## 2022-01-07 NOTE — TELEPHONE ENCOUNTER
Spoke with pt. Advised of message. Still having fever, chills and fatigue. Out of window for monoclonal antibodies. Did advise pt if develops SOB or chest pain should goto ER.

## 2022-01-07 NOTE — TELEPHONE ENCOUNTER
He can make a virtual visit at lunch to discuss further treatments for symptoms. He needs to use Tylenol, rest, push fluids. Monoclonal antibodies are an option. Let me know.

## 2022-01-07 NOTE — TELEPHONE ENCOUNTER
Patient called stating he tested positive for COVID over a week ago.  Patient states it is not going away and wants to know what he is suppose to do

## 2022-01-10 DIAGNOSIS — U07.1 COVID-19: Primary | ICD-10-CM

## 2022-01-10 NOTE — TELEPHONE ENCOUNTER
Pt wife called to let Dr James Rose know that the pt is running a fever, fatigue, slight cough, lose of taste ans smell. He can not seem to get the fever to break. The fever has been going on 3-4 days.  Please give pt a call [phone]

## 2022-01-14 ENCOUNTER — HOSPITAL ENCOUNTER (INPATIENT)
Age: 77
LOS: 3 days | Discharge: HOME | DRG: 177 | End: 2022-01-17
Attending: INTERNAL MEDICINE | Admitting: INTERNAL MEDICINE
Payer: MEDICARE

## 2022-01-14 ENCOUNTER — APPOINTMENT (OUTPATIENT)
Dept: GENERAL RADIOLOGY | Age: 77
DRG: 177 | End: 2022-01-14
Payer: MEDICARE

## 2022-01-14 ENCOUNTER — TELEPHONE (OUTPATIENT)
Dept: FAMILY MEDICINE CLINIC | Age: 77
End: 2022-01-14

## 2022-01-14 DIAGNOSIS — Z28.9 COVID-19 VACCINATION NOT DONE: ICD-10-CM

## 2022-01-14 DIAGNOSIS — R09.02 HYPOXEMIA: Primary | ICD-10-CM

## 2022-01-14 DIAGNOSIS — U07.1 COVID-19: ICD-10-CM

## 2022-01-14 DIAGNOSIS — J12.9 VIRAL PNEUMONIA: ICD-10-CM

## 2022-01-14 PROBLEM — J12.82 PNEUMONIA DUE TO COVID-19 VIRUS: Status: ACTIVE | Noted: 2022-01-14

## 2022-01-14 LAB
A/G RATIO: 0.8 (ref 1.1–2.2)
ALBUMIN SERPL-MCNC: 3.1 G/DL (ref 3.4–5)
ALP BLD-CCNC: 387 U/L (ref 40–129)
ALT SERPL-CCNC: 52 U/L (ref 10–40)
ANION GAP SERPL CALCULATED.3IONS-SCNC: 14 MMOL/L (ref 3–16)
AST SERPL-CCNC: 97 U/L (ref 15–37)
BASOPHILS ABSOLUTE: 0 K/UL (ref 0–0.2)
BASOPHILS RELATIVE PERCENT: 0.1 %
BILIRUB SERPL-MCNC: 1.2 MG/DL (ref 0–1)
BUN BLDV-MCNC: 18 MG/DL (ref 7–20)
C-REACTIVE PROTEIN: 79.5 MG/L (ref 0–5.1)
CALCIUM SERPL-MCNC: 8.2 MG/DL (ref 8.3–10.6)
CHLORIDE BLD-SCNC: 99 MMOL/L (ref 99–110)
CO2: 25 MMOL/L (ref 21–32)
CREAT SERPL-MCNC: 0.8 MG/DL (ref 0.8–1.3)
D DIMER: 681 NG/ML DDU (ref 0–229)
EOSINOPHILS ABSOLUTE: 0 K/UL (ref 0–0.6)
EOSINOPHILS RELATIVE PERCENT: 0.3 %
GFR AFRICAN AMERICAN: >60
GFR NON-AFRICAN AMERICAN: >60
GLUCOSE BLD-MCNC: 104 MG/DL (ref 70–99)
HCT VFR BLD CALC: 45.6 % (ref 40.5–52.5)
HEMOGLOBIN: 15.4 G/DL (ref 13.5–17.5)
LACTATE DEHYDROGENASE: 512 U/L (ref 100–190)
LACTIC ACID, SEPSIS: 1.7 MMOL/L (ref 0.4–1.9)
LACTIC ACID, SEPSIS: 2.1 MMOL/L (ref 0.4–1.9)
LYMPHOCYTES ABSOLUTE: 0.5 K/UL (ref 1–5.1)
LYMPHOCYTES RELATIVE PERCENT: 11.3 %
MCH RBC QN AUTO: 28.9 PG (ref 26–34)
MCHC RBC AUTO-ENTMCNC: 33.8 G/DL (ref 31–36)
MCV RBC AUTO: 85.5 FL (ref 80–100)
MONOCYTES ABSOLUTE: 0.3 K/UL (ref 0–1.3)
MONOCYTES RELATIVE PERCENT: 6.6 %
NEUTROPHILS ABSOLUTE: 3.9 K/UL (ref 1.7–7.7)
NEUTROPHILS RELATIVE PERCENT: 81.7 %
PDW BLD-RTO: 14.7 % (ref 12.4–15.4)
PLATELET # BLD: 152 K/UL (ref 135–450)
PMV BLD AUTO: 7.4 FL (ref 5–10.5)
POTASSIUM REFLEX MAGNESIUM: 4.8 MMOL/L (ref 3.5–5.1)
PRO-BNP: 113 PG/ML (ref 0–449)
PROCALCITONIN: 0.17 NG/ML (ref 0–0.15)
RBC # BLD: 5.34 M/UL (ref 4.2–5.9)
SARS-COV-2, NAAT: DETECTED
SODIUM BLD-SCNC: 138 MMOL/L (ref 136–145)
TOTAL PROTEIN: 7 G/DL (ref 6.4–8.2)
TROPONIN: <0.01 NG/ML
WBC # BLD: 4.7 K/UL (ref 4–11)

## 2022-01-14 PROCEDURE — 84145 PROCALCITONIN (PCT): CPT

## 2022-01-14 PROCEDURE — 36415 COLL VENOUS BLD VENIPUNCTURE: CPT

## 2022-01-14 PROCEDURE — 94761 N-INVAS EAR/PLS OXIMETRY MLT: CPT

## 2022-01-14 PROCEDURE — 87635 SARS-COV-2 COVID-19 AMP PRB: CPT

## 2022-01-14 PROCEDURE — 2580000003 HC RX 258: Performed by: INTERNAL MEDICINE

## 2022-01-14 PROCEDURE — 2060000000 HC ICU INTERMEDIATE R&B

## 2022-01-14 PROCEDURE — 83605 ASSAY OF LACTIC ACID: CPT

## 2022-01-14 PROCEDURE — 83880 ASSAY OF NATRIURETIC PEPTIDE: CPT

## 2022-01-14 PROCEDURE — 99284 EMERGENCY DEPT VISIT MOD MDM: CPT

## 2022-01-14 PROCEDURE — 83615 LACTATE (LD) (LDH) ENZYME: CPT

## 2022-01-14 PROCEDURE — 6370000000 HC RX 637 (ALT 250 FOR IP): Performed by: PHYSICIAN ASSISTANT

## 2022-01-14 PROCEDURE — 6360000002 HC RX W HCPCS: Performed by: PHYSICIAN ASSISTANT

## 2022-01-14 PROCEDURE — 80053 COMPREHEN METABOLIC PANEL: CPT

## 2022-01-14 PROCEDURE — 85379 FIBRIN DEGRADATION QUANT: CPT

## 2022-01-14 PROCEDURE — 94640 AIRWAY INHALATION TREATMENT: CPT

## 2022-01-14 PROCEDURE — 2700000000 HC OXYGEN THERAPY PER DAY

## 2022-01-14 PROCEDURE — 71045 X-RAY EXAM CHEST 1 VIEW: CPT

## 2022-01-14 PROCEDURE — 87449 NOS EACH ORGANISM AG IA: CPT

## 2022-01-14 PROCEDURE — 6360000002 HC RX W HCPCS: Performed by: INTERNAL MEDICINE

## 2022-01-14 PROCEDURE — 86140 C-REACTIVE PROTEIN: CPT

## 2022-01-14 PROCEDURE — 6370000000 HC RX 637 (ALT 250 FOR IP): Performed by: INTERNAL MEDICINE

## 2022-01-14 PROCEDURE — 87040 BLOOD CULTURE FOR BACTERIA: CPT

## 2022-01-14 PROCEDURE — 84484 ASSAY OF TROPONIN QUANT: CPT

## 2022-01-14 PROCEDURE — 85025 COMPLETE CBC W/AUTO DIFF WBC: CPT

## 2022-01-14 RX ORDER — POLYETHYLENE GLYCOL 3350 17 G/17G
17 POWDER, FOR SOLUTION ORAL DAILY PRN
Status: DISCONTINUED | OUTPATIENT
Start: 2022-01-14 | End: 2022-01-17 | Stop reason: HOSPADM

## 2022-01-14 RX ORDER — DEXAMETHASONE SODIUM PHOSPHATE 4 MG/ML
10 INJECTION, SOLUTION INTRA-ARTICULAR; INTRALESIONAL; INTRAMUSCULAR; INTRAVENOUS; SOFT TISSUE ONCE
Status: COMPLETED | OUTPATIENT
Start: 2022-01-14 | End: 2022-01-14

## 2022-01-14 RX ORDER — SODIUM CHLORIDE 0.9 % (FLUSH) 0.9 %
5-40 SYRINGE (ML) INJECTION EVERY 12 HOURS SCHEDULED
Status: DISCONTINUED | OUTPATIENT
Start: 2022-01-14 | End: 2022-01-17 | Stop reason: HOSPADM

## 2022-01-14 RX ORDER — ONDANSETRON 2 MG/ML
4 INJECTION INTRAMUSCULAR; INTRAVENOUS EVERY 6 HOURS PRN
Status: DISCONTINUED | OUTPATIENT
Start: 2022-01-14 | End: 2022-01-17 | Stop reason: HOSPADM

## 2022-01-14 RX ORDER — DEXAMETHASONE SODIUM PHOSPHATE 10 MG/ML
10 INJECTION, SOLUTION INTRAMUSCULAR; INTRAVENOUS EVERY 24 HOURS
Status: DISCONTINUED | OUTPATIENT
Start: 2022-01-19 | End: 2022-01-15

## 2022-01-14 RX ORDER — IPRATROPIUM BROMIDE AND ALBUTEROL SULFATE 2.5; .5 MG/3ML; MG/3ML
1 SOLUTION RESPIRATORY (INHALATION) ONCE
Status: DISCONTINUED | OUTPATIENT
Start: 2022-01-14 | End: 2022-01-14

## 2022-01-14 RX ORDER — SODIUM CHLORIDE 9 MG/ML
25 INJECTION, SOLUTION INTRAVENOUS PRN
Status: DISCONTINUED | OUTPATIENT
Start: 2022-01-14 | End: 2022-01-17 | Stop reason: HOSPADM

## 2022-01-14 RX ORDER — ACETAMINOPHEN 650 MG/1
650 SUPPOSITORY RECTAL EVERY 6 HOURS PRN
Status: DISCONTINUED | OUTPATIENT
Start: 2022-01-14 | End: 2022-01-17 | Stop reason: HOSPADM

## 2022-01-14 RX ORDER — ACETAMINOPHEN 325 MG/1
650 TABLET ORAL EVERY 6 HOURS PRN
Status: DISCONTINUED | OUTPATIENT
Start: 2022-01-14 | End: 2022-01-17 | Stop reason: HOSPADM

## 2022-01-14 RX ORDER — SODIUM CHLORIDE 0.9 % (FLUSH) 0.9 %
5-40 SYRINGE (ML) INJECTION PRN
Status: DISCONTINUED | OUTPATIENT
Start: 2022-01-14 | End: 2022-01-17 | Stop reason: HOSPADM

## 2022-01-14 RX ORDER — METOPROLOL TARTRATE 50 MG/1
50 TABLET, FILM COATED ORAL DAILY
Status: DISCONTINUED | OUTPATIENT
Start: 2022-01-15 | End: 2022-01-17 | Stop reason: HOSPADM

## 2022-01-14 RX ORDER — ASPIRIN 81 MG/1
81 TABLET, CHEWABLE ORAL DAILY
Status: DISCONTINUED | OUTPATIENT
Start: 2022-01-14 | End: 2022-01-17 | Stop reason: HOSPADM

## 2022-01-14 RX ORDER — ONDANSETRON 4 MG/1
4 TABLET, ORALLY DISINTEGRATING ORAL EVERY 8 HOURS PRN
Status: DISCONTINUED | OUTPATIENT
Start: 2022-01-14 | End: 2022-01-17 | Stop reason: HOSPADM

## 2022-01-14 RX ORDER — ALBUTEROL SULFATE 90 UG/1
2 AEROSOL, METERED RESPIRATORY (INHALATION) ONCE
Status: COMPLETED | OUTPATIENT
Start: 2022-01-14 | End: 2022-01-14

## 2022-01-14 RX ADMIN — SODIUM CHLORIDE, PRESERVATIVE FREE 10 ML: 5 INJECTION INTRAVENOUS at 20:57

## 2022-01-14 RX ADMIN — Medication 2 PUFF: at 13:29

## 2022-01-14 RX ADMIN — DEXAMETHASONE SODIUM PHOSPHATE 20 MG: 4 INJECTION, SOLUTION INTRA-ARTICULAR; INTRALESIONAL; INTRAMUSCULAR; INTRAVENOUS; SOFT TISSUE at 20:56

## 2022-01-14 RX ADMIN — ASPIRIN 81 MG 81 MG: 81 TABLET ORAL at 18:20

## 2022-01-14 RX ADMIN — DEXAMETHASONE SODIUM PHOSPHATE 10 MG: 4 INJECTION, SOLUTION INTRAMUSCULAR; INTRAVENOUS at 13:30

## 2022-01-14 RX ADMIN — ENOXAPARIN SODIUM 30 MG: 100 INJECTION SUBCUTANEOUS at 20:57

## 2022-01-14 ASSESSMENT — ENCOUNTER SYMPTOMS
SHORTNESS OF BREATH: 1
SORE THROAT: 0
DIARRHEA: 0
RHINORRHEA: 1
VOMITING: 0
NAUSEA: 1
BACK PAIN: 0
ABDOMINAL PAIN: 0
COUGH: 1
CONSTIPATION: 0
EYE PAIN: 0

## 2022-01-14 NOTE — TELEPHONE ENCOUNTER
Patient's wife called stating patient is currently at [facility] ER. Wife is requesting Dr Sushma Jacobson order monoclonal infusion for the patient.  Patient's wife states hospital will not administer once the patient is admitted as inpatient and they are wanting to admit the patient for at least 4 days

## 2022-01-14 NOTE — PROGRESS NOTES
Pt arrived to [address] @ 1610. Pt alert and oriented. Ambulated to chair from stretcher without difficulty. VS and assessment completed as pt allowed. Pt is currently still wearing an undershirt along with jeans and gym shoes as he refused to remove any of them. Weight completed on standing scale with all of these items on. Pt educated on the use of incentive spirometer. Pt was able to demonstrate an understanding of these directions. Pt educated on proning. Pt oriented to room and safety measures. Pt stated he has been very nauseous and has a very poor appetite along with difficulty swallowing. SLP and dietician consulted. Pt was able to tolerate chicken broth at this time. Pt is resting quietly in chair at this time with call light in reach.    Electronically signed by Ammy Vallejo RN on 1/14/22 at 6:08 PM EST

## 2022-01-14 NOTE — TELEPHONE ENCOUNTER
Let them know he is not eligible for the infusion if symptoms are greater than 8days old. He needs admitted.

## 2022-01-14 NOTE — H&P
Hospital Medicine History & Physical      PCP: Andreia Dean DO    Date of Admission: 1/14/2022    Date of Service: Pt seen/examined on 1/14/22 and Admitted to Inpatient with expected LOS greater than two midnights due to medical therapy. Chief Complaint:    Cough/ SOB      History Of Present Illness:    Galo Garcia is a 78 y.o. male who presented to [facility] with 14 days of malaise/ generalised weakness and myalgias assoc with anorexia. In the last 4 days he developed a cough with yellowish sputum and fevers. He has anosmia/ taste normal. No chest pain. No abdo pain/N/V/D  No urinary symptoms. Positive covid contact with wife who hd Covid. Wife is requesting Dr Rene Lewis order monoclonal infusion for the patient- we told him monoclonal AB are for out patients at risk of deteriorating/ to help prevent hospitalisation. He is out of the AB window. Past Medical History:    Anxiety    No past medical history on file. Past Surgical History:      No past surgical history on file. Medications Prior to Admission:      Prior to Admission medications    Medication Sig Start Date End Date Taking? Authorizing Provider   metoprolol tartrate (LOPRESSOR) 50 MG tablet TAKE ONE TABLET BY MOUTH DAILY 12/22/21  Yes Andreia Dean DO       Allergies:  Patient has no known allergies. Social History:      The patient currently lives at home. TOBACCO:   reports that he has never smoked. He has never used smokeless tobacco.  ETOH:   reports current alcohol use. E-Cigarettes/Vaping Use     Questions Responses    E-Cigarette/Vaping Use     Start Date     Passive Exposure     Quit Date     Counseling Given     Comments             Family History:      Reviewed in detail and negative for DM, CAD, Cancer, CVA. Positive as follows:    No family history on file. REVIEW OF SYSTEMS COMPLETED:   Pertinent positives as noted in the HPI. All other systems reviewed and negative.     PHYSICAL EXAM PERFORMED:    /65   Pulse 77   Temp 98.8 °F (37.1 °C) (Oral)   Resp 26   SpO2 96%     General appearance:  Anxious. No apparent distress, appears stated age and cooperative. HEENT:  Normal cephalic, atraumatic without obvious deformity. Pupils equal, round, and reactive to light. Extra ocular muscles intact. Conjunctivae/corneas clear. Neck: Supple, with full range of motion. No jugular venous distention. Trachea midline. Respiratory:  Normal respiratory effort. Bilateral crackles. Cardiovascular:  Regular rate and rhythm with normal S1/S2 without murmurs, rubs or gallops. Abdomen: Soft, non-tender, non-distended with normal bowel sounds. Musculoskeletal:  No clubbing, cyanosis or edema bilaterally. Full range of motion without deformity. Skin: Skin color, texture, turgor normal.  No rashes or lesions. Neurologic:  Neurovascularly intact without any focal sensory/motor deficits. Cranial nerves: II-XII intact, grossly non-focal.  Psychiatric:  Alert and oriented, thought content appropriate, normal insight  Capillary Refill: Brisk,3 seconds, normal  Peripheral Pulses: +2 palpable, equal bilaterally       Labs:     Recent Labs     01/14/22  1131   WBC 4.7   HGB 15.4   HCT 45.6        Recent Labs     01/14/22  1131      K 4.8   CL 99   CO2 25   BUN 18   CREATININE 0.8   CALCIUM 8.2*     Recent Labs     01/14/22  1131   AST 97*   ALT 52*   BILITOT 1.2*   ALKPHOS 387*     No results for input(s): INR in the last 72 hours. Recent Labs     01/14/22  1131   TROPONINI <0.01       Urinalysis:      Lab Results   Component Value Date    BLOODU small 01/17/2018    SPECGRAV 1.025 01/17/2018    GLUCOSEU neg 01/17/2018       Radiology:     CXR: I have reviewed the CXR with the following interpretation: gisela infiltrates  EKG:  I have reviewed the EKG with the following interpretation: none    XR CHEST PORTABLE   Final Result   Equivocal peripheral ground-glass pulmonary infiltrates bilaterally. Cardiomegaly without overt failure. ASSESSMENT:  1. Acute hypoxic respiratory failure sec to 2.  2. Covid 19 pneumonia  3. Anxiety      PLAN:  1. Covid 19 pathway;  -crp/procal/cpk/d-dimer  -supplemental oxygen, keep pulse ox >90%  -urine legio/strepn Ag  -decadron 20mg iv x 5 days then de-escalate to 10mg iv qd  -pharm to help with Toci v baci therapy  2. Albuterol inh prn      DVT Prophylaxis: lovenox  Diet: Diet NPO Effective Now  Code Status: No Order    PT/OT Eval Status: none    Dispo - admit as an inpatient. Arnel Rendon MD    Thank you Melisa Herring DO for the opportunity to be involved in this patient's care. If you have any questions or concerns please feel free to contact me at [phone].

## 2022-01-14 NOTE — TELEPHONE ENCOUNTER
Spoke with patients wife. Informed her that patient would not be eligible for the infusion since symptoms have been going on longer than 10 days. She states she [facility] and once she mentioned patients breathing and they informed her they would not pick patient up and insisted she call 911. She called squ and patient did go however they want to know if he can be prescribed the steroid breathing treatment before patient is admitted. She states her son is high up with [facility] and [employer] and has informed them that once patient is admitted he will not receive the treatment. Informed her that I did not know what they would do once admitted but if the concern is with patients breathing he should be admitted and Dr. Susan Hermosillo agrees. Patients wife requested message be sent to Dr. Susan Hermosillo to see if he would send the treatment in.

## 2022-01-14 NOTE — ED PROVIDER NOTES
[address] [facility]        Pt Name: Gisselle Sommers  MRN: [id number]  [place] 1945  Date of evaluation: 1/14/2022  Provider: Rudy Aguiar PA-C  PCP: Joey Norman DO  Note Started: 1:29 PM EST      DAVID. I have evaluated this patient. My supervising physician was available for consultation. Triage CHIEF COMPLAINT       Chief Complaint   Patient presents with    Fatigue     Wife recently positive with Covid 19, patient hypoxic to 86% on arrival on room air, no o2 use at baseline.  Concern For COVID-19    Shortness of Breath    Cough         HISTORY OF PRESENT ILLNESS   (Location/Symptom, Timing/Onset, Context/Setting, Quality, Duration, Modifying Factors, Severity)  Note limiting factors. Chief Complaint: Shortness of breath and fatigue    Gisselle Sommers is a 78 y.o. male who presents to the emergency department, the patient states that he was started not feeling well January 1, his wife had COVID, he has never been tested but he states that initially he had fevers, chills, sweats, cough, congestion. He states that he was fighting that for a while but over the last 3 days he has been profoundly short of breath, weak, unable to walk across the room and has associated nausea. He denies any chest pain, denies any problems with urination. Nursing Notes were all reviewed and agreed with or any disagreements were addressed in the HPI. REVIEW OF SYSTEMS    (2-9 systems for level 4, 10 or more for level 5)     Review of Systems   Constitutional: Positive for chills, diaphoresis and fever. HENT: Positive for congestion and rhinorrhea. Negative for ear pain and sore throat. Eyes: Negative for pain and visual disturbance. Respiratory: Positive for cough and shortness of breath. Cardiovascular: Negative for chest pain, palpitations and leg swelling. Gastrointestinal: Positive for nausea.  Negative for abdominal pain, constipation, diarrhea and vomiting. Genitourinary: Negative for decreased urine volume, dysuria, frequency and urgency. Musculoskeletal: Negative for back pain and neck pain. Skin: Negative for rash and wound. Neurological: Negative for dizziness and light-headedness. PAST MEDICAL HISTORY   No past medical history on file. SURGICAL HISTORY   No past surgical history on file. CURRENTMEDICATIONS       Previous Medications    METOPROLOL TARTRATE (LOPRESSOR) 50 MG TABLET    TAKE ONE TABLET BY MOUTH DAILY       ALLERGIES     Patient has no known allergies. FAMILYHISTORY     No family history on file. SOCIAL HISTORY       Social History     Socioeconomic History    Marital status:      Spouse name: Not on file    Number of children: Not on file    Years of education: Not on file    Highest education level: Not on file   Occupational History    Not on file   Tobacco Use    Smoking status: Never Smoker    Smokeless tobacco: Never Used   Substance and Sexual Activity    Alcohol use: Yes     Comment: ONCE A DAY     Drug use: No    Sexual activity: Yes     Partners: Female   Other Topics Concern    Not on file   Social History Narrative    Not on file     Social Determinants of Health     Financial Resource Strain:     Difficulty of Paying Living Expenses: Not on file   Food Insecurity:     Worried About Running Out of Food in the Last Year: Not on file    Vaughn of Food in the Last Year: Not on file   Transportation Needs:     Lack of Transportation (Medical): Not on file    Lack of Transportation (Non-Medical):  Not on file   Physical Activity:     Days of Exercise per Week: Not on file    Minutes of Exercise per Session: Not on file   Stress:     Feeling of Stress : Not on file   Social Connections:     Frequency of Communication with Friends and Family: Not on file    Frequency of Social Gatherings with Friends and Family: Not on file    Attends Faith Services: Not on file    Active Member of Clubs or Organizations: Not on file    Attends Club or Organization Meetings: Not on file    Marital Status: Not on file   Intimate Partner Violence:     Fear of Current or Ex-Partner: Not on file    Emotionally Abused: Not on file    Physically Abused: Not on file    Sexually Abused: Not on file   Housing Stability:     Unable to Pay for Housing in the Last Year: Not on file    Number of [place] in the Last Year: Not on file    Unstable Housing in the Last Year: Not on file       SCREENINGS             PHYSICAL EXAM    (up to 7 for level 4, 8 or more for level 5)     ED Triage Vitals [01/14/22 1104]   BP Temp Temp Source Pulse Resp SpO2 Height Weight   (!) 142/57 98.8 °F (37.1 °C) Oral 76 19 (S) (!) 86 % -- --       Physical Exam  Vitals and nursing note reviewed. Constitutional:       Appearance: Normal appearance. He is well-developed. He is not ill-appearing or diaphoretic. HENT:      Head: Normocephalic and atraumatic. Right Ear: External ear normal.      Left Ear: External ear normal.      Nose: Nose normal.   Eyes:      General:         Right eye: No discharge. Left eye: No discharge. Cardiovascular:      Rate and Rhythm: Normal rate and regular rhythm. Heart sounds: Normal heart sounds. No murmur heard. No friction rub. No gallop. Pulmonary:      Effort: Pulmonary effort is normal. No respiratory distress. Breath sounds: No stridor. Wheezing present. No rales. Chest:      Chest wall: No tenderness. Abdominal:      General: Bowel sounds are normal. There is no distension. Palpations: Abdomen is soft. There is no mass. Tenderness: There is no abdominal tenderness. There is no guarding or rebound. Musculoskeletal:         General: Normal range of motion. Cervical back: Normal range of motion and neck supple. Skin:     General: Skin is warm and dry. Coloration: Skin is not pale. Neurological:      General: No focal deficit present. Mental Status: He is alert and oriented to person, place, and time.    Psychiatric:         Mood and Affect: Mood normal.         Behavior: Behavior normal.         DIAGNOSTIC RESULTS   LABS:    Labs Reviewed   COVID-19, RAPID - Abnormal; Notable for the following components:       Result Value    SARS-CoV-2, NAAT DETECTED (*)     All other components within normal limits    Narrative:     Performed at:  [address] [place] [address]   Phone [phone]   CBC WITH AUTO DIFFERENTIAL - Abnormal; Notable for the following components:    Lymphocytes Absolute 0.5 (*)     All other components within normal limits    Narrative:     Performed at:  [address] [place] [address]   Phone [phone]   COMPREHENSIVE METABOLIC PANEL W/ REFLEX TO MG FOR LOW K - Abnormal; Notable for the following components:    Glucose 104 (*)     Calcium 8.2 (*)     Albumin 3.1 (*)     Albumin/Globulin Ratio 0.8 (*)     Total Bilirubin 1.2 (*)     Alkaline Phosphatase 387 (*)     ALT 52 (*)     AST 97 (*)     All other components within normal limits    Narrative:     Performed at:  [address] [place] [address]   Phone [phone]   LACTATE, SEPSIS - Abnormal; Notable for the following components:    Lactic Acid, Sepsis 2.1 (*)     All other components within normal limits    Narrative:     Performed at:  [address] [facility] [place] [address]   Phone [phone]   CULTURE, BLOOD 1   CULTURE, BLOOD 2   TROPONIN    Narrative:     Performed at:  [address] [facility] [place] [address]   Phone [phone]   BRAIN NATRIURETIC PEPTIDE    Narrative:     Performed at:  [facility]  [address] [facility] [address]   Phone [phone]   LACTATE, SEPSIS   COVID-19   COVID-19   COVID-19       When ordered, only abnormal lab results are displayed. All other labs were within normal range or not returned as of this dictation. EKG: When ordered, EKG's are interpreted by the Emergency Department Physician in the absence of a cardiologist.  Please see their note for interpretation of EKG. RADIOLOGY:   Non-plain film images such as CT, Ultrasound and MRI are read by the radiologist. Bobby Pucker radiographic images are visualized andpreliminarily interpreted by the  ED Provider with the below findings:        Interpretation perthe Radiologist below, if available at the time of this note:    XR CHEST PORTABLE   Final Result   Equivocal peripheral ground-glass pulmonary infiltrates bilaterally. Cardiomegaly without overt failure. XR CHEST PORTABLE    Result Date: 1/14/2022  EXAMINATION: ONE XRAY VIEW OF THE CHEST 1/14/2022 11:42 am COMPARISON: None. HISTORY: ORDERING SYSTEM PROVIDED HISTORY: SOB TECHNOLOGIST PROVIDED HISTORY: Reason for exam:->SOB Reason for Exam: Fatigue; Concern For COVID-19; Shortness of Breath; Cough FINDINGS: The cardiac silhouette is enlarged. There are questionable areas of ground-glass opacification in the axillary portion of the right lung and mid left lung. Findings may be artifactual and related to overlying soft tissues. The lungs otherwise are clear. No pleural fluid. Moderate dextroscoliosis of the thoracic spine. Equivocal peripheral ground-glass pulmonary infiltrates bilaterally. Cardiomegaly without overt failure. PROCEDURES   Unless otherwise noted below, none     Procedures    CRITICAL CARE TIME     There was a high probability of life-threatening clinical deterioration in the patient's condition requiring my urgent intervention.   I personally saw the patient and independently provided 35 minutes of non-concurrent critical care out of the total shared critical care time provided, excluding separately reportable procedures.  this was due to the patient's hypoxemia. CONSULTS:  IP CONSULT TO HOSPITALIST  IP CONSULT TO PULMONOLOGY      EMERGENCY DEPARTMENT COURSE and DIFFERENTIAL DIAGNOSIS/MDM:   Vitals:    Vitals:    01/14/22 1132 01/14/22 1245 01/14/22 1315 01/14/22 1330   BP: 136/65 (!) 121/92 123/68    Pulse: 77 81 72    Resp: 26 28 30 (!) 33   Temp:       TempSrc:       SpO2: 96% 92% 98% 91%       Patient was given thefollowing medications:  Medications   Dexamethasone Sodium Phosphate injection 10 mg (10 mg IntraVENous Given 1/14/22 1330)   albuterol sulfate  (90 Base) MCG/ACT inhaler 2 puff (2 puffs Inhalation Given 1/14/22 1329)     And   ipratropium (ATROVENT HFA) 17 MCG/ACT inhaler 2 puff (2 puffs Inhalation Given 1/14/22 1329)           This patient presented frankly hypoxic, responded well to initial oxygen, there is a high suspicion for COVID-19. We will admit the patient for management of his hypoxemia, including steroids, appropriate albuterol, and possible monoclonal antibodies. FINAL IMPRESSION      1. Hypoxemia    2. COVID-19    3. COVID-19 vaccination not done    4. Viral pneumonia          DISPOSITION/PLAN   DISPOSITION Admitted 01/14/2022 01:01:22 PM      PATIENT REFERREDTO:  No follow-up provider specified.     DISCHARGE MEDICATIONS:  New Prescriptions    No medications on file       DISCONTINUED MEDICATIONS:  Discontinued Medications    No medications on file              (Please note that portions ofthis note were completed with a voice recognition program.  Efforts were made to edit the dictations but occasionally words are mis-transcribed.)    Ashkan Esqueda PA-C (electronically signed)             Ashkan Esqueda PA-C  01/14/22 6142

## 2022-01-14 NOTE — PROGRESS NOTES
4 Eyes Skin Assessment     NAME:  Kennedy Winston  YOB: 1945  MEDICAL RECORD NUMBER:  [id number]    The patient is being assess for  Admission    I agree that 2 RN's have performed a thorough Head to Toe Skin Assessment on the patient. ALL assessment sites listed below have been assessed. Areas assessed by both nurses:    Head, Face, Ears and Arms, Elbows, Hands REFUSED ALL OTHER AREAS        Does the Patient have a Wound?  No noted wound(s)       Mario Prevention initiated:  No   Wound Care Orders initiated:  No    Pressure Injury (Stage 3,4, Unstageable, DTI, NWPT, and Complex wounds) if present place consult order under [de-identified] No    New and Established Ostomies if present place consult order under : No      Nurse 1 eSignature: Electronically signed by Erika Castro RN on 1/14/22 at 6:09 PM EST    **SHARE this note so that the co-signing nurse is able to place an eSignature**    Nurse 2 eSignature: Electronically signed by Alice Hoover RN on 1/14/22 at 6:10 PM EST

## 2022-01-15 LAB
C-REACTIVE PROTEIN: 52.8 MG/L (ref 0–5.1)
D DIMER: 470 NG/ML DDU (ref 0–229)
HCT VFR BLD CALC: 43.8 % (ref 40.5–52.5)
HEMOGLOBIN: 14.9 G/DL (ref 13.5–17.5)
L. PNEUMOPHILA SEROGP 1 UR AG: NORMAL
MCH RBC QN AUTO: 29.1 PG (ref 26–34)
MCHC RBC AUTO-ENTMCNC: 34.1 G/DL (ref 31–36)
MCV RBC AUTO: 85.2 FL (ref 80–100)
PDW BLD-RTO: 14.9 % (ref 12.4–15.4)
PLATELET # BLD: 136 K/UL (ref 135–450)
PLATELET SLIDE REVIEW: ADEQUATE
PMV BLD AUTO: 7.9 FL (ref 5–10.5)
RBC # BLD: 5.14 M/UL (ref 4.2–5.9)
STREP PNEUMONIAE ANTIGEN, URINE: NORMAL
TROPONIN: <0.01 NG/ML
WBC # BLD: 2 K/UL (ref 4–11)

## 2022-01-15 PROCEDURE — 92610 EVALUATE SWALLOWING FUNCTION: CPT

## 2022-01-15 PROCEDURE — 2060000000 HC ICU INTERMEDIATE R&B

## 2022-01-15 PROCEDURE — 99223 1ST HOSP IP/OBS HIGH 75: CPT | Performed by: INTERNAL MEDICINE

## 2022-01-15 PROCEDURE — 97161 PT EVAL LOW COMPLEX 20 MIN: CPT

## 2022-01-15 PROCEDURE — 97530 THERAPEUTIC ACTIVITIES: CPT

## 2022-01-15 PROCEDURE — 6370000000 HC RX 637 (ALT 250 FOR IP): Performed by: INTERNAL MEDICINE

## 2022-01-15 PROCEDURE — XW033H5 INTRODUCTION OF TOCILIZUMAB INTO PERIPHERAL VEIN, PERCUTANEOUS APPROACH, NEW TECHNOLOGY GROUP 5: ICD-10-PCS | Performed by: INTERNAL MEDICINE

## 2022-01-15 PROCEDURE — 94761 N-INVAS EAR/PLS OXIMETRY MLT: CPT

## 2022-01-15 PROCEDURE — 84484 ASSAY OF TROPONIN QUANT: CPT

## 2022-01-15 PROCEDURE — 6360000002 HC RX W HCPCS: Performed by: INTERNAL MEDICINE

## 2022-01-15 PROCEDURE — 2580000003 HC RX 258: Performed by: INTERNAL MEDICINE

## 2022-01-15 PROCEDURE — 97165 OT EVAL LOW COMPLEX 30 MIN: CPT

## 2022-01-15 PROCEDURE — 36415 COLL VENOUS BLD VENIPUNCTURE: CPT

## 2022-01-15 PROCEDURE — 85027 COMPLETE CBC AUTOMATED: CPT

## 2022-01-15 PROCEDURE — 86140 C-REACTIVE PROTEIN: CPT

## 2022-01-15 PROCEDURE — 2700000000 HC OXYGEN THERAPY PER DAY

## 2022-01-15 PROCEDURE — 92526 ORAL FUNCTION THERAPY: CPT

## 2022-01-15 PROCEDURE — 85379 FIBRIN DEGRADATION QUANT: CPT

## 2022-01-15 PROCEDURE — 97116 GAIT TRAINING THERAPY: CPT

## 2022-01-15 RX ORDER — LEVOFLOXACIN 5 MG/ML
500 INJECTION, SOLUTION INTRAVENOUS EVERY 24 HOURS
Status: DISCONTINUED | OUTPATIENT
Start: 2022-01-15 | End: 2022-01-17

## 2022-01-15 RX ORDER — DEXAMETHASONE SODIUM PHOSPHATE 10 MG/ML
10 INJECTION, SOLUTION INTRAMUSCULAR; INTRAVENOUS EVERY 24 HOURS
Status: DISCONTINUED | OUTPATIENT
Start: 2022-01-15 | End: 2022-01-17 | Stop reason: HOSPADM

## 2022-01-15 RX ORDER — DEXAMETHASONE SODIUM PHOSPHATE 10 MG/ML
10 INJECTION, SOLUTION INTRAMUSCULAR; INTRAVENOUS EVERY 24 HOURS
Status: DISCONTINUED | OUTPATIENT
Start: 2022-01-19 | End: 2022-01-17 | Stop reason: HOSPADM

## 2022-01-15 RX ADMIN — METOPROLOL TARTRATE 50 MG: 50 TABLET, FILM COATED ORAL at 09:17

## 2022-01-15 RX ADMIN — ENOXAPARIN SODIUM 30 MG: 100 INJECTION SUBCUTANEOUS at 20:01

## 2022-01-15 RX ADMIN — LEVOFLOXACIN 500 MG: 5 INJECTION, SOLUTION INTRAVENOUS at 12:36

## 2022-01-15 RX ADMIN — SODIUM CHLORIDE, PRESERVATIVE FREE 10 ML: 5 INJECTION INTRAVENOUS at 17:34

## 2022-01-15 RX ADMIN — ASPIRIN 81 MG 81 MG: 81 TABLET ORAL at 09:17

## 2022-01-15 RX ADMIN — SODIUM CHLORIDE, PRESERVATIVE FREE 10 ML: 5 INJECTION INTRAVENOUS at 20:02

## 2022-01-15 RX ADMIN — SODIUM CHLORIDE 25 ML: 9 INJECTION, SOLUTION INTRAVENOUS at 12:33

## 2022-01-15 RX ADMIN — ENOXAPARIN SODIUM 30 MG: 100 INJECTION SUBCUTANEOUS at 09:17

## 2022-01-15 RX ADMIN — DEXAMETHASONE SODIUM PHOSPHATE 10 MG: 10 INJECTION, SOLUTION INTRAMUSCULAR; INTRAVENOUS at 17:34

## 2022-01-15 RX ADMIN — TOCILIZUMAB 648 MG: 180 INJECTION, SOLUTION SUBCUTANEOUS at 00:39

## 2022-01-15 ASSESSMENT — PAIN SCALES - GENERAL
PAINLEVEL_OUTOF10: 0

## 2022-01-15 NOTE — CONSULTS
RD did not conduct direct, in-person nutrition evaluation in efforts to reduce exposure and use of PPE for high risk persons, PUI persons, patients who have tested positive for Covid-19 or those awaiting respiratory panel results. EMR was screened for nutrition risk factors, as defined per nutrition standards of care. Comprehensive Nutrition Assessment    Type and Reason for Visit:  Initial,Wound,Positive Nutrition Screen    Nutrition Recommendations/Plan:   - Continue current diet  - Monitor for SLP eval results  - Begin Ensure Enlive BID  - Document meal and supplement intakes in flowsheet    Nutrition Assessment:  RD consult for poor intakes. Pt in droplet plus isolation for COVID+. Pt has lost 13lbs over the last nine months although insignificant. On regular, no caffeine diet at this time with no intakes recorded. Note SLP consult. Will trial Ensure Enlive and monitor for acceptance. Malnutrition Assessment:  Malnutrition Status:  No malnutrition    Context:  Chronic Illness       Estimated Daily Nutrient Needs:  Energy (kcal):  9423-3950 (20-25kcal/87kg); Weight Used for Energy Requirements:  Current     Protein (g):  101-118 (1.2-1.4g/84kg); Weight Used for Protein Requirements:  Ideal        Fluid (ml/day): 1 ml/kcal      Nutrition Related Findings:  Labs reviewed. Wounds:  None       Current Nutrition Therapies:    ADULT DIET; Regular; No Caffeine    Anthropometric Measures:  · Height: 6' 1\" (185.4 cm)  · Current Body Weight: 192 lb (87.1 kg)   · Admission Body Weight: 192 lb (87.1 kg)     · Ideal Body Weight: 184 lbs; % Ideal Body Weight 104.3 %   · BMI: 25.3   · BMI Categories: Overweight (BMI 25.0-29. 9)       Nutrition Diagnosis:   · Predicted inadequate energy intake related to other (comment) (COVID) as evidenced by poor intake prior to admission    Nutrition Interventions:   Food and/or Nutrient Delivery:  Continue Current Diet,Start Oral Nutrition Supplement  Nutrition Education/Counseling:  Education not indicated   Coordination of Nutrition Care:  Continue to monitor while inpatient    Goals:  PO intake greater than 50%       Nutrition Monitoring and Evaluation:   Behavioral-Environmental Outcomes:  None Identified   Food/Nutrient Intake Outcomes:  Food and Nutrient Intake,Supplement Intake  Physical Signs/Symptoms Outcomes:  Weight,Chewing or Swallowing     Discharge Planning:     Too soon to determine     Electronically signed by Shanique Jean-Baptiste RD, LD on 1/15/22 at 10:14 AM EST    Contact: [phone]

## 2022-01-15 NOTE — PROGRESS NOTES
Speech Language Pathology  Facility/Department: [address] PROGRESSIVE CARE   CLINICAL BEDSIDE SWALLOW EVALUATION    NAME: Blanca Chadwick  : 1945  MRN: [id number]    ADMISSION DATE: 2022  ADMITTING DIAGNOSIS: The primary encounter diagnosis was Hypoxemia. Diagnoses of COVID-19, COVID-19 vaccination not done, and Viral pneumonia were also pertinent to this visit. · has Palpitations; Hyperlipidemia; PAULINA (obstructive sleep apnea); and Pneumonia due to COVID-19 virus on their problem list.  · covid 19 droplet plus contact precautions  ·  has a past medical history of Arrhythmia and Cataracts, bilateral.  · Baseline: poor appetite for a couple of weeks and solid food problems. Pt reports no O2 needs at baseline; IND ambulation  ONSET DATE: 2022    Date of Eval: 1/15/2022  Evaluating Therapist: Kathline Cooks, SLP     Chart Review  MD History and Physical Documentation revealed:   History Of Present Illness:    Josef Catalan is a 78 y.o. male who presented to [facility] with 14 days of malaise/ generalized weakness and myalgias assoc with anorexia. In the last 4 days he developed a cough with yellowish sputum and fevers. He has anosmia/ taste normal. No chest pain. No abdo pain/N/V/D  No urinary symptoms. Positive covid contact with wife who hd Covid. 2022 Chest XR  Impression   Equivocal peripheral ground-glass pulmonary infiltrates bilaterally. Cardiomegaly without overt failure. Current Diet level:  Current Diet : Regular  Current Liquid Diet : Thin      Primary Complaint   Pt reports problems with appetite and eating food for a couple of weeks    Reason for Referral  Blanca Chadwick was referred for a bedside swallow evaluation to assess the efficiency of his swallow function, identify signs and symptoms of aspiration and make recommendations regarding safe dietary consistencies, effective compensatory strategies, and safe eating environment. Assessment Impression  1.  Pt was in chair upon SLP entry. Pt was oriented to self; place; date and reason for admit. Pt was verbally responsive and able to follow commands. Pt was IND in re-positioning in chair. Pt currently on 4L/min O2 via nasal cannula. 2. Dysphagia Diagnosis: Mild Oropharyngeal Dysphagia characterized by prolonged mastication; variable holding with reduced initiation of A-P oral transit; delayed initiation of swallow and potential reduced laryngeal elevation. · Pt with piecemeal swallows of regular solid foods and complaints of stomach/wave of nausea   ·  Pt tolerated all thin and thick liquids; puree, minced and moist, soft/bite size, easy to chew, regular solid food consistencies without overt clinical s/s of aspiration or voice quality changes. · O2 SATS 91 to 95 throughout session. · As documented above pt with more complaints of wave of nausea;/stomach upset. Potential need for referral back to MD/GI if persists. Discussed with RN and encouraged pt to let nurse know when nauseous  Recommend trial downgrade to soft/bite size food consistencies with continue thin liquids  SLP f/u 3-5 times for dysphagia f/u and treatment trial  · Discussed with RN    Dysphagia Outcome Severity Scale: Level 5: Mild dysphagia- Distant supervision. May need one diet consistency restricted     Treatment Plan  Requires SLP Intervention: Yes  Duration/Frequency of Treatment: 3-5 times a week while on medical floor  D/C Recommendations: To be determined       Recommended Diet and Intervention  Diet Solids Recommendation: Dysphagia Soft and Bite-Sized (Dysphagia III)  Liquid Consistency Recommendation: Thin        Therapeutic Interventions: Diet tolerance monitoring; Therapeutic PO trials with SLP;Patient/Family education    Compensatory Swallowing Strategies  Compensatory Swallowing Strategies: Upright as possible for all oral intake;Small bites/sips;Swallow 2 times per bite/sip; Remain upright for 30-45 minutes after meals    Treatment/Goals   Pt reporting needs something other than regular foods at this time; needs liquids and softer items  Dysphagia Goals:   1. The patient will tolerate dysphagia soft/bite size food consistencies with thin liquid diet without observed clinical signs of aspiration;  2, The patient/caregiver will demonstrate understanding of compensatory strategies for improved swallowing safety.;  3. The patient will tolerate regular consistency solids 10/10. General  Chart Reviewed: Yes  Behavior/Cognition: Cooperative; Alert;Pleasant mood (VErbally responsive; able to follow commands; Oriented x 4)  Respiratory Status: O2 via nasal cannula (4L/min O2 via nasal cannula)  Communication Observation: Functional  Follows Directions: Simple  Dentition: Adequate  Prior Dysphagia History: Problems for about 2 weeks. Pt reports appetite is coming back  Patient Positioning: Upright in chair  Consistencies Administered: Reg solid; Dysphagia Soft and Bite-Sized (Dysphagia III); Dysphagia Minced and Moist (Dysphagia II); Dysphagia Pureed (Dysphagia I); Honey - cup;Nectar - cup;Nectar - straw; Thin - cup; Thin - straw       Pain: denied    Vision/Hearing  Vision  Vision: Impaired  Vision Exceptions: Wears glasses for reading (has cataract implants)  Hearing  Hearing: Within functional limits    Oral Motor Deficits  Oral/Motor  Oral Motor: Exceptions to [facility]  Labial ROM:  (fairly symmetrical volitional rom)  Lingual Symmetry:  (slight deviation on volitional lingual protrusion/elevation, otherwise good rom.  Good lateralization rom)  Velum:  (slight asym during phonation of /a/)  Gag:  (diminished to absent)   Vocal Quality:  (unremarkable)  Volitional Cough: Strong  Volitional Swallow: Delayed    Oral Phase Dysfunction  Oral Phase  Oral Phase: Exceptions  Oral Phase Dysfunction  Impaired Mastication: Reg Solid (p;prolonged; piecemeal swallows)  Decreased Anterior to Posterior Transit:  (disorganized; episodic holding prior to initiation of A-Poral transit)  Suspected Premature Bolus Loss:  (potential)  Lingual/Palatal Residue:  (pt has sensation and spontaneously clears)     Indicators of Pharyngeal Phase Dysfunction   Pharyngeal Phase  Pharyngeal Phase: Exceptions  Indicators of Pharyngeal Phase Dysfunction  Delayed Swallow: All  Decreased Laryngeal Elevation:  (potential)  Pharyngeal Phase   Pharyngeal: Pt with complaints with solid foods when swallowing but reports stomach upset/wave of nausea    Prognosis  Prognosis  Prognosis for safe diet advancement: good (guarded medical DX; medical co-morbidities; complaints more with solid food dysphagia versus complaints of stomach upset)    Education  Patient Education Response: Verbalizes understanding;Demonstrated understanding;Needs reinforcement  Safety Devices in place: Yes  Type of devices: Call light within reach; Chair alarm in place;Nurse notified       Therapy Time  SLP Individual Minutes  Time In: 0940  Time Out: 9930  Minutes: 48          Signed  Bela Carr MS,[phone]  Speech and Language Pathologist  1/15/2022 10:33 AM

## 2022-01-15 NOTE — PROGRESS NOTES
Occupational Therapy   Occupational Therapy Initial Assessment and Tentative D/C    Date: 1/15/2022   Patient Name: Sofie Landry  MRN: [id number]     : 1945    Date of Service: 1/15/2022    Discharge Recommendations: Sofie Landry scored a 21/24 on the AM-PAC ADL Inpatient form. Current research shows that an AM-PAC score of 18 or greater is typically associated with a discharge to the patient's home setting. If patient discharges prior to next session this note will serve as a discharge summary. Please see below for the latest assessment towards goals. Continue to assess pending progress,Home with assist PRN  OT Equipment Recommendations  Other: continue to assess; possible shower chair pending improved O2 and endurance    Assessment   Performance deficits / Impairments: Decreased functional mobility ; Decreased endurance;Decreased ADL status; Decreased balance;Decreased high-level IADLs  Assessment: Sofie Landry is a 78 y.o. male who presents to the emergency department, the patient states that he was started not feeling well , his wife had COVID, he has never been tested but he states that initially he had fevers, chills, sweats, cough, congestion. He states that he was fighting that for a while but over the last 3 days he has been profoundly short of breath, weak, unable to walk across the room and has associated nausea. He denies any chest pain, denies any problems with urination. PTA pt from home with wife grandson where pt was Ind with mobility and ADLs. Pt currently functioning below baseline completing mobility and transfers with supervision/SBA and no device. Pt on 4L O2 with SpO2 decreasing briefly into high 80s and returns >90% with seated rest break. Anticipate pt needing up to supervision/SBA for ADLs. Pt will benefit from skilled OT services at this time. Anticipate pt safe to return home with progression in therapy.   Prognosis: Good  Decision Making: Low Complexity  Exam: see above  OT Education: OT Role;Plan of Care;Transfer Training  REQUIRES OT FOLLOW UP: Yes  Activity Tolerance  Activity Tolerance: Patient Tolerated treatment well  Safety Devices  Safety Devices in place: Yes  Type of devices: Left in chair;Call light within reach;Nurse notified           Patient Diagnosis(es): The primary encounter diagnosis was Hypoxemia. Diagnoses of COVID-19, COVID-19 vaccination not done, and Viral pneumonia were also pertinent to this visit. has a past medical history of Arrhythmia and Cataracts, bilateral.   has no past surgical history on file. Restrictions  Restrictions/Precautions  Restrictions/Precautions: Isolation  Position Activity Restriction  Other position/activity restrictions: COVID +, 4 L NC (no O2 baseline)    Subjective   General  Chart Reviewed: Yes  Patient assessed for rehabilitation services?: Yes  Additional Pertinent Hx: per ED note, Joy Hilario is a 78 y.o. male who presents to the emergency department, the patient states that he was started not feeling well January 1, his wife had COVID, he has never been tested but he states that initially he had fevers, chills, sweats, cough, congestion. He states that he was fighting that for a while but over the last 3 days he has been profoundly short of breath, weak, unable to walk across the room and has associated nausea. He denies any chest pain, denies any problems with urination. Family / Caregiver Present: No  Referring Practitioner: Ana Blue MD  Subjective  Subjective: Pt agreeable to OT evaluation. Pt reports no pain. Pt on 4L O2 throughout.   Pain Assessment  Pain Level: 0  Vital Signs  Temp: 97.7 °F (36.5 °C)  Temp Source: Oral  Pulse: 78  Heart Rate Source: Monitor  Resp: 22  BP: 123/72  BP Location: Left Arm  MAP (mmHg): 89  Oxygen Therapy  SpO2: 90 %  O2 Device: Nasal cannula  Social/Functional History  Social/Functional History  Lives With: Family (wife, bo)  Type of Home: House  Home Layout: Able to Live on Main level with bedroom/bathroom,Multi-level (garage and laundry first floor)  Home Access: Stairs to enter without rails  Entrance Stairs - Number of Steps: 2 KELLIE  Bathroom Shower/Tub: Walk-in shower  Bathroom Toilet: Standard  Bathroom Equipment:  (no prior bathroom DME)  Home Equipment:  (no prior DME)  ADL Assistance: Independent  Homemaking Assistance:  (wife does cooking, cleaning, laundry)  Ambulation Assistance: Independent  Transfer Assistance: Independent  Active : Yes  Occupation: Retired       Objective   Vision: Within [address] Exceptions: Wears glasses for reading  Hearing: Exceptions to [employer] (20% hearing in left ear)  Hearing Exceptions: Hard of hearing/hearing concerns    Orientation  Overall Orientation Status: Within Functional Limits  Orientation Level: Oriented X4  Observation/Palpation  Posture: Good  Observation: slight flexed posture  Balance  Sitting Balance: Independent  Standing Balance: Supervision  Functional Mobility  Functional - Mobility Device: No device  Activity: Other (40ft x2)  Assist Level: Stand by assistance  Functional Mobility Comments: no overt LOB; pt's SpO2 briefly decreasing into high 80s and returns >90% with seated rest break  Wheelchair Bed Transfers  Wheelchair/Bed - Technique: Ambulating  Equipment Used: Bed;Other (bed to chair)  Level of Asssistance: Stand by assistance;Supervision  ADL  Additional Comments: Anticipate pt needing up to SBA for ADLs based on ROM, strength, and balance  Tone RUE  RUE Tone: Normotonic  Tone LUE  LUE Tone: Normotonic  Coordination  Movements Are Fluid And Coordinated: Yes     Bed mobility  Supine to Sit: Independent  Sit to Supine: Unable to assess  Scooting: Independent  Transfers  Sit to stand: Supervision;Stand by assistance  Stand to sit: Supervision;Stand by assistance     Cognition  Overall Cognitive Status: WFL  Cognition Comment: impulsive Sensation  Overall Sensation Status: WFL        LUE AROM (degrees)  LUE AROM : WFL  RUE AROM (degrees)  RUE AROM : WFL  LUE Strength  Gross LUE Strength: WFL  RUE Strength  Gross RUE Strength: WFL                   Plan   Plan  Times per week: 3-5x  Current Treatment Recommendations: Strengthening,Functional Mobility Training,Gait Training,Endurance Training,Balance Training,Self-Care / ADL,Safety Education & Training,Equipment Evaluation, Education, & procurement,Patient/Caregiver Education & Training      AM-PAC Score        AM-PAC Inpatient Daily Activity Raw Score: 21 (01/15/22 0757)  AM-PAC Inpatient ADL T-Scale Score : 44.27 (01/15/22 0757)  ADL Inpatient CMS 0-100% Score: 32.79 (01/15/22 0757)  ADL Inpatient CMS G-Code Modifier : Audie Brian (01/15/22 0757)    Goals  Short term goals  Time Frame for Short term goals: prior to D/C  Short term goal 1: complete functional mobility and transfers Ind  Short term goal 2: complete bathing and dressing Ind  Short term goal 3: complete toileting Ind  Short term goal 4: complete grooming in stance at sink Ind  Long term goals  Time Frame for Long term goals : STG=LTG  Patient Goals   Patient goals : return home       Therapy Time   Individual Concurrent Group Co-treatment   Time In 0725         Time Out 0755         Minutes 30         Timed Code Treatment Minutes: 15 Minutes (15 minute eval)       FRANKO Santana/SALVATORE

## 2022-01-15 NOTE — PROGRESS NOTES
Patient has no complaints at this time. O2 saturation remained above 90%, no increased shortness of breath . Patient able to ambulate from bed to bathroom without a decrease in SpO2. Oxygen applied via nasal cannula at 4 L. Will continue to monitor.

## 2022-01-15 NOTE — PROGRESS NOTES
Physical Therapy    Facility/Department: [address] PROGRESSIVE CARE  Initial Assessment    NAME: Luisa Ferrara  : 1945  MRN: [id number]    Date of Service: 1/15/2022    Discharge Recommendations:  Home with assist PRN (not further therapy needs at discharge)   PT Equipment Recommendations  Equipment Needed: No  Luisa Ferrara scored a 18/24 on the AM-PAC short mobility form. At this time, no further PT is recommended upon discharge due to no further needs at discharge. Recommend patient returns to prior setting with prior services. This note also serves as a D/C Summary in the event that this patient is discharged prior to the next therapy session. Assessment   Body structures, Functions, Activity limitations: Decreased functional mobility ; Decreased endurance  Assessment: Pt 77 y/o male COVID 23 with hypoxia and 14 days of malaise/ generalised weakness and myalgias assoc with anorexia. In the last 4 days he developed a cough with yellowish sputum and fevers. Pt independent with all functional mobility tasks prior to admission. Pt participated well wit hdecreased insight and impulsive tendencies poor awareness of line management safety. Increased cues for fatigue, only mild desaturation on 4 L NC but recovers quickly after mobility tasks. Recommnend continued PT while in hospital to promote increased strength, activity tolerance, and safety with mobility tasks. Treatment Diagnosis: decreased activity tolerance  Prognosis: Good  Decision Making: Low Complexity  History: see below  Exam: see below  Clinical Presentation: stable, evolving  PT Education: Goals;PT Role;Plan of Care;Gait Training;General Safety; Disease Specific Education; Functional Mobility Training;Energy Conservation  Barriers to Learning: insight  REQUIRES PT FOLLOW UP: Yes  Activity Tolerance  Activity Tolerance: Patient Tolerated treatment well;Patient limited by endurance       Patient Diagnosis(es): The primary encounter diagnosis was Hypoxemia. Diagnoses of COVID-19, COVID-19 vaccination not done, and Viral pneumonia were also pertinent to this visit. has a past medical history of Arrhythmia and Cataracts, bilateral.   has no past surgical history on file. Restrictions  Restrictions/Precautions  Restrictions/Precautions: Isolation  Position Activity Restriction  Other position/activity restrictions: COVID +, 4 L NC (no O2 baseline)  Vision/Hearing  Vision: Within Functional Limits  Vision Exceptions: Wears glasses for reading  Hearing: Exceptions to [facility] (20% hearing in left ear)  Hearing Exceptions: Hard of hearing/hearing concerns     Subjective  General  Chart Reviewed: Yes  Patient assessed for rehabilitation services?: Yes  Additional Pertinent Hx: \" Kerri Neville is a 78 y.o. male who presented to [facility] with 14 days of malaise/ generalised weakness and myalgias assoc with anorexia. In the last 4 days he developed a cough with yellowish sputum and fevers. He has anosmia/ taste normal. No chest pain. No abdo pain/N/V/DNo urinary symptoms. Positive covid contact with wife who hd Covid. Wife is requesting Dr Akua Nino order monoclonal infusion for the patient- we told him monoclonal AB are for out patients at risk of deteriorating/ to help prevent hospitalisation. He is out of the AB window. \" ( Dr Petros Marie 1/14/22)  Response To Previous Treatment: Not applicable  Family / Caregiver Present: No  Referring Practitioner: Petros Marie MD  Referral Date : 01/14/22  Follows Commands: Within Functional Limits  General Comment  Comments: goes by \"john\"  Subjective  Subjective: SUpine in bed, easy to arouse.  Pt without complaints and agreeable to PT evaluation          Orientation  Orientation  Overall Orientation Status: Within Normal Limits  Social/Functional History  Social/Functional History  Lives With: Family (wife, grandson)  Type of Home: House  Home Layout: Able to Live on Main level with bedroom/bathroom,Multi-level (garage and laundry first floor)  Home Access: Stairs to enter without rails  Entrance Stairs - Number of Steps: 2 KELLIE  Bathroom Shower/Tub: Walk-in shower  Bathroom Toilet: Standard  Bathroom Equipment:  (no prior bathroom DME)  Home Equipment:  (no prior DME)  ADL Assistance: Independent  Homemaking Assistance:  (wife does cooking, cleaning, laundry)  Ambulation Assistance: Independent  Transfer Assistance: Independent  Active : Yes  Occupation: Retired  Cognition        Objective     Observation/Palpation  Posture: Good  Observation: slight flexed posture    AROM RLE (degrees)  RLE AROM: WFL  AROM LLE (degrees)  LLE AROM : WFL  AROM RUE (degrees)  RUE AROM : WFL  AROM LUE (degrees)  LUE AROM : WFL  Strength RLE  Strength RLE: WFL  Strength LLE  Strength LLE: WFL  Strength RUE  Strength RUE: WFL  Strength LUE  Strength LUE: WFL  Tone RLE  RLE Tone: Normotonic  Tone LLE  LLE Tone: Normotonic  Motor Control  Gross Motor?: WFL  Sensation  Overall Sensation Status: WFL  Bed mobility  Supine to Sit: Independent  Sit to Supine: Unable to assess  Scooting: Independent  Transfers  Sit to Stand: Contact guard assistance  Stand to sit: Contact guard assistance  Ambulation  Ambulation?: Yes  Ambulation 1  Surface: level tile  Device: No Device  Assistance: Stand by assistance  Quality of Gait: impulsive with poor awareness of line management with safety concerns needing cues, incresaed sway in each direction, risk for falls  Gait Deviations: Decreased arm swing; Slow Zakiya; Increased SNEHAL  Distance: 36' X 2 trials  Comments: SPO2% 87% briefly, typically 90-92% throughout session  Stairs/Curb  Stairs?: No (could before with increased time)     Balance  Posture: Good  Sitting - Static: Good  Sitting - Dynamic: Good  Standing - Static: Good  Standing - Dynamic: Good  Comments: SBA with cues for safety        Plan   Plan  Times per week: 2-3X  Current Treatment Recommendations: Strengthening,Balance Training,Endurance Training,Functional Mobility Training,Transfer Training,Gait Training,Home Exercise Program,Safety Education & Training,Patient/Caregiver Education & Training,Equipment Evaluation, Education, & procurement  Safety Devices  Type of devices:  All fall risk precautions in place,Call light within reach,Gait belt,Patient at risk for falls,Left in chair,Nurse notified (RN present in room at completion of session)      AM-PAC Score  AM-PAC Inpatient Mobility Raw Score : 18 (01/15/22 0750)  AM-PAC Inpatient T-Scale Score : 43.63 (01/15/22 0750)  Mobility Inpatient CMS 0-100% Score: 46.58 (01/15/22 0750)  Mobility Inpatient CMS G-Code Modifier : CK (01/15/22 0750)          Goals  Short term goals  Time Frame for Short term goals: Prior to discharge  Short term goal 1: Transfers sit <-> stand mod I  Short term goal 2: Pt will ambulate with LRAD 100' mod I  Short term goal 3: Perform standing HEP to improve strength and activity tolerance  Long term goals  Time Frame for Long term goals : STG=LTG  Patient Goals   Patient goals : \"return home\"       Therapy Time   Individual Concurrent Group Co-treatment   Time In 0725         Time Out 0755         Minutes 30         Timed Code Treatment Minutes: Sujey [id number] PT       Electronically signed by Clive Dimas PT on 1/15/22 at 8:00 AM EST

## 2022-01-15 NOTE — PLAN OF CARE
Problem: Airway Clearance - Ineffective  Goal: Achieve or maintain patent airway  Outcome: Ongoing  Note: Patient able to maintain a patent airway. Problem: Gas Exchange - Impaired  Goal: Absence of hypoxia  Outcome: Ongoing  Note: O2 saturation remained above 90% . Oxygen applied via nasal cannula at 4 L. Will continue to monitor. Problem: Breathing Pattern - Ineffective  Goal: Ability to achieve and maintain a regular respiratory rate  Outcome: Ongoing  Note: Patients respirations within normal limits     Problem: Isolation Precautions - Risk of Spread of Infection  Goal: Prevent transmission of infection  Outcome: Ongoing  Note: Patient is in droplet plus isolation.  Nurse Vikash Sanon in [place], Gloves, N95 and goggles while in patient's room to prevent the spread of infection

## 2022-01-15 NOTE — CONSULTS
REASON FOR CONSULTATION/CC: covid / pneumonia      Consult at request of Kp Cervantes MD for      PCP: Romulo Rosado DO  Established Pulmonologist:  None    HISTORY OF PRESENT ILLNESS: Paige Bloch is a 78y.o. year old male with a history of  who presents with     He is using cpap at home. Using cpap nightly > 4 hours per day. No issues. No complaints mask fit or humidification issues. Knows to changes mask and hoses every 6 months. His wife was diagnosed with covid 23 ~ Jan 1st who is improving. He started coming ill ~ 2 weeks ago. He had symptoms of cough , phlegm, shortness of breath and fatigue. While he as actually improving by symptoms, he developed increased hypoxemia. This note may have been  transcribed using [employer]. Please disregard any translational errors. Assessment:        COVID-19  Acute hypoxemic respiratory failure with saturations 85% on room air  Sleep apnea- severe AHI 36 with nocturnal desaturation    Plan:      Hospital Day 1     COVID-19 pneumonia with hypoxemia  *Symptoms 2 weeks prior to presentation *Chest x-ray more consistent with bacterial pneumonia rather than COVID-19 initial procalcitonin 0.17.  *Status post Actemra  *With mild hypoxemia and minimal discrete lungs, decreased Decadron down to 1 0 mg daily. Consider 6 mg daily  *monitor procalcitonin daily with Levaquin. * Wean O2 to sat >90%    *     PAULINA  * advised to have someone bring in home cpap      This note was transcribed using [id number]. Please disregard any translational errors. Thank you for the consult    [facility]  [phone]             Data:     PAST MEDICAL HISTORY:  Past Medical History:   Diagnosis Date    Arrhythmia     Cataracts, bilateral        PAST SURGICAL HISTORY:  History reviewed. No pertinent surgical history. FAMILY HISTORY:  family history is not on file.     SOCIAL HISTORY:   reports that he has never smoked. He has never used smokeless tobacco.    Scheduled Meds:   metoprolol tartrate  50 mg Oral Daily    sodium chloride flush  5-40 mL IntraVENous 2 times per day    aspirin  81 mg Oral Daily    enoxaparin  30 mg SubCUTAneous BID    dexamethasone  20 mg IntraVENous Q24H    Followed by   Alvaro Holter ON 1/19/2022] dexamethasone  10 mg IntraVENous Q24H       Continuous Infusions:   sodium chloride         PRN Meds:  sodium chloride flush, sodium chloride, ondansetron **OR** ondansetron, acetaminophen **OR** acetaminophen, polyethylene glycol    ALLERGIES:  Patient has No Known Allergies. REVIEW OF SYSTEMS:  Constitutional: Negative for fever    HENT: Negative for sore throat  Eyes: Negative for redness   Respiratory: Negative for dyspnea, + cough  Cardiovascular: Negative for chest pain  Gastrointestinal: Negative for vomiting, diarrhea    Genitourinary: Negative for hematuria   Musculoskeletal: Negative for arthralgias   Skin: Negative for rash  Neurological: Negative for syncope  Hematological: Negative for adenopathy  Psychiatric/Behavorial: Negative for anxiety    Objective:   PHYSICAL EXAM:  Blood pressure 123/72, pulse 78, temperature 97.7 °F (36.5 °C), temperature source Oral, resp. rate 22, height 6' 1\" (1.854 m), weight 192 lb 0.3 oz (87.1 kg), SpO2 91 %.'  Gen: No distress. Eyes: PERRL. No sclera icterus. No conjunctival injection. ENT: No discharge. Pharynx clear. External appearance of ears and nose normal.  Neck: Trachea midline. No obvious mass. Resp: No accessory muscle use. No crackles. No wheezes. No rhonchi. CV: Regular rate. Regular rhythm. No murmur or rub. No edema. GI: Non-tender. Non-distended. No hernia. Skin: Warm, dry, normal texture and turgor. No nodule on exposed extremities. Lymph: No cervical LAD. No supraclavicular LAD. M/S: No cyanosis. No clubbing. No joint deformity. Neuro: Moves all four extremities.     Psych: Oriented x 3. No anxiety. Awake. Alert. Intact judgement and insight. Data Reviewed:   LABS:  CBC:   Recent Labs     01/14/22  1131 01/15/22  0702   WBC 4.7 2.0*   HGB 15.4 14.9   HCT 45.6 43.8   MCV 85.5 85.2    136     BMP:   Recent Labs     01/14/22  1131      K 4.8   CL 99   CO2 25   BUN 18   CREATININE 0.8     LIVER PROFILE:   Recent Labs     01/14/22  1131   AST 97*   ALT 52*   BILITOT 1.2*   ALKPHOS 387*     PT/INR: No results for input(s): PROTIME, INR in the last 72 hours. APTT: No results for input(s): APTT in the last 72 hours. UA:No results for input(s): NITRITE, COLORU, PHUR, LABCAST, WBCUA, RBCUA, MUCUS, TRICHOMONAS, YEAST, BACTERIA, CLARITYU, SPECGRAV, LEUKOCYTESUR, UROBILINOGEN, BILIRUBINUR, BLOODU, GLUCOSEU, AMORPHOUS in the last 72 hours. Invalid input(s): KETONESU  No results for input(s): PHART, BSO3ZVL, PO2ART in the last 72 hours. Vent Information  Skin Assessment: Clean, dry, & intact  SpO2: 91 %    Radiology Review:  Pertinent images / reports were reviewed as a part of this visit. CT Chest w/ contrast: No results found for this or any previous visit. CT Chest w/o contrast: No results found for this or any previous visit. CTPA: No results found for this or any previous visit. CXR PA/LAT: No results found for this or any previous visit. CXR portable: Results for orders placed during the hospital encounter of 01/14/22    XR CHEST PORTABLE    Narrative  EXAMINATION:  ONE XRAY VIEW OF THE CHEST    1/14/2022 11:42 am    COMPARISON:  None. HISTORY:  ORDERING SYSTEM PROVIDED HISTORY: SOB  TECHNOLOGIST PROVIDED HISTORY:  Reason for exam:->SOB  Reason for Exam: Fatigue; Concern For COVID-19; Shortness of Breath; Cough    FINDINGS:  The cardiac silhouette is enlarged. There are questionable areas of  ground-glass opacification in the axillary portion of the right lung and mid  left lung.   Findings may be artifactual and related to overlying soft  tissues. The lungs otherwise are clear. No pleural fluid. Moderate  dextroscoliosis of the thoracic spine. Impression  Equivocal peripheral ground-glass pulmonary infiltrates bilaterally. Cardiomegaly without overt failure.

## 2022-01-15 NOTE — PLAN OF CARE
Nutrition Problem #1: Predicted inadequate energy intake  Intervention: Food and/or Nutrient Delivery: Continue Current Diet,Start Oral Nutrition Supplement  Nutritional Goals: PO intake greater than 50%

## 2022-01-15 NOTE — PROGRESS NOTES
Hospitalist Progress Note      PCP: Víctor Hoyos DO    Date of Admission: 1/14/2022    Chief Complaint:   SOB    Hospital Course:   Alice Sommers is a 78 y.o. male who presented to [facility] with 14 days of malaise/ generalised weakness and myalgias assoc with anorexia. In the last 4 days he developed a cough with yellowish sputum and fevers. He has anosmia/ taste normal. No chest pain. No abdo pain/N/V/D  No urinary symptoms. Positive covid contact with wife who hd Covid.      Wife is requesting Dr Myah Candelario order monoclonal infusion for the patient- we told him monoclonal AB are for out patients at risk of deteriorating/ to help prevent hospitalisation. He is out of the AB window. Subjective:   Feeling and breathing easier  No F/C  No CP       Medications:  Reviewed    Infusion Medications    sodium chloride       Scheduled Medications    dexamethasone  10 mg IntraVENous Q24H    Followed by   Wendy Madden ON 1/19/2022] dexamethasone  10 mg IntraVENous Q24H    levofloxacin  500 mg IntraVENous Q24H    metoprolol tartrate  50 mg Oral Daily    sodium chloride flush  5-40 mL IntraVENous 2 times per day    aspirin  81 mg Oral Daily    enoxaparin  30 mg SubCUTAneous BID     PRN Meds: sodium chloride flush, sodium chloride, ondansetron **OR** ondansetron, acetaminophen **OR** acetaminophen, polyethylene glycol      Intake/Output Summary (Last 24 hours) at 1/15/2022 1120  Last data filed at 1/15/2022 1029  Gross per 24 hour   Intake 300 ml   Output --   Net 300 ml       Physical Exam Performed:    /72   Pulse 78   Temp 97.7 °F (36.5 °C) (Oral)   Resp 22   Ht 6' 1\" (1.854 m)   Wt 192 lb 0.3 oz (87.1 kg)   SpO2 91%   BMI 25.33 kg/m²        General appearance:  Anxious. No apparent distress, appears stated age and cooperative. HEENT:  Normal cephalic, atraumatic without obvious deformity. Pupils equal, round, and reactive to light. Extra ocular muscles intact.  Conjunctivae/corneas clear.  Neck: Supple, with full range of motion. No jugular venous distention. Trachea midline. Respiratory:  Normal respiratory effort. Bilateral crackles. Cardiovascular:  Regular rate and rhythm with normal S1/S2 without murmurs, rubs or gallops. Abdomen: Soft, non-tender, non-distended with normal bowel sounds. Musculoskeletal:  No clubbing, cyanosis or edema bilaterally. Full range of motion without deformity. Skin: Skin color, texture, turgor normal.  No rashes or lesions. Neurologic:  Neurovascularly intact without any focal sensory/motor deficits. Cranial nerves: II-XII intact, grossly non-focal.  Psychiatric:  Alert and oriented, thought content appropriate, normal insight  Capillary Refill: Brisk,3 seconds, normal  Peripheral Pulses: +2 palpable, equal bilaterally        Labs:   Recent Labs     01/14/22  1131 01/15/22  0702   WBC 4.7 2.0*   HGB 15.4 14.9   HCT 45.6 43.8    136     Recent Labs     01/14/22  1131      K 4.8   CL 99   CO2 25   BUN 18   CREATININE 0.8   CALCIUM 8.2*     Recent Labs     01/14/22  1131   AST 97*   ALT 52*   BILITOT 1.2*   ALKPHOS 387*     No results for input(s): INR in the last 72 hours. Recent Labs     01/14/22  1750 01/14/22  2120 01/15/22  0702   TROPONINI <0.01 <0.01 <0.01       Urinalysis:      Lab Results   Component Value Date    BLOODU small 01/17/2018    SPECGRAV 1.025 01/17/2018    GLUCOSEU neg 01/17/2018       Radiology:  XR CHEST PORTABLE   Final Result   Equivocal peripheral ground-glass pulmonary infiltrates bilaterally. Cardiomegaly without overt failure. ASSESSMENT:  1. Acute hypoxic respiratory failure sec to 2- unresolved. 2. Covid 19 pneumonia with resp compromise  3. Anxiety        PLAN:  1. Continue on the Covid 19 pathway;  -crp/procal/cpk/d-dimer  -supplemental oxygen, keep pulse ox >90%  -urine legio/strepn Ag  -decadron 20mg iv x 5 days then de-escalate to 10mg iv qd  -pharm to help with Toci v baci therapy  2. Albuterol inh prn        DVT Prophylaxis: lovenox  Diet: Diet NPO Effective Now  Code Status: No Order     PT/OT Eval Status: none     Dispo - admit as an inpatient.     Vinnie Babin MD

## 2022-01-16 LAB
C-REACTIVE PROTEIN: 17.4 MG/L (ref 0–5.1)
D DIMER: 292 NG/ML DDU (ref 0–229)
PROCALCITONIN: 0.09 NG/ML (ref 0–0.15)

## 2022-01-16 PROCEDURE — 94761 N-INVAS EAR/PLS OXIMETRY MLT: CPT

## 2022-01-16 PROCEDURE — 2060000000 HC ICU INTERMEDIATE R&B

## 2022-01-16 PROCEDURE — 6360000002 HC RX W HCPCS: Performed by: INTERNAL MEDICINE

## 2022-01-16 PROCEDURE — 86140 C-REACTIVE PROTEIN: CPT

## 2022-01-16 PROCEDURE — 2700000000 HC OXYGEN THERAPY PER DAY

## 2022-01-16 PROCEDURE — 99232 SBSQ HOSP IP/OBS MODERATE 35: CPT | Performed by: NURSE PRACTITIONER

## 2022-01-16 PROCEDURE — 6370000000 HC RX 637 (ALT 250 FOR IP): Performed by: INTERNAL MEDICINE

## 2022-01-16 PROCEDURE — 84145 PROCALCITONIN (PCT): CPT

## 2022-01-16 PROCEDURE — 85379 FIBRIN DEGRADATION QUANT: CPT

## 2022-01-16 PROCEDURE — 36415 COLL VENOUS BLD VENIPUNCTURE: CPT

## 2022-01-16 PROCEDURE — 2580000003 HC RX 258: Performed by: INTERNAL MEDICINE

## 2022-01-16 RX ADMIN — ENOXAPARIN SODIUM 30 MG: 100 INJECTION SUBCUTANEOUS at 08:56

## 2022-01-16 RX ADMIN — METOPROLOL TARTRATE 50 MG: 50 TABLET, FILM COATED ORAL at 08:56

## 2022-01-16 RX ADMIN — DEXAMETHASONE SODIUM PHOSPHATE 10 MG: 10 INJECTION, SOLUTION INTRAMUSCULAR; INTRAVENOUS at 17:16

## 2022-01-16 RX ADMIN — ASPIRIN 81 MG 81 MG: 81 TABLET ORAL at 08:56

## 2022-01-16 RX ADMIN — ENOXAPARIN SODIUM 30 MG: 100 INJECTION SUBCUTANEOUS at 20:20

## 2022-01-16 RX ADMIN — SODIUM CHLORIDE, PRESERVATIVE FREE 10 ML: 5 INJECTION INTRAVENOUS at 20:20

## 2022-01-16 RX ADMIN — SODIUM CHLORIDE, PRESERVATIVE FREE 10 ML: 5 INJECTION INTRAVENOUS at 09:54

## 2022-01-16 RX ADMIN — LEVOFLOXACIN 500 MG: 5 INJECTION, SOLUTION INTRAVENOUS at 11:48

## 2022-01-16 ASSESSMENT — PAIN SCALES - GENERAL
PAINLEVEL_OUTOF10: 0
PAINLEVEL_OUTOF10: 0

## 2022-01-16 NOTE — PLAN OF CARE
Problem: Airway Clearance - Ineffective  Goal: Achieve or maintain patent airway  Outcome: Ongoing     Problem: Gas Exchange - Impaired  Goal: Absence of hypoxia  Outcome: Ongoing  Note: Patient educated on I/S and using I/S 8-10 times a hour. Patient educated on proning 1-2 hours a day to improve oxygen levels in the body. Problem: Isolation Precautions - Risk of Spread of Infection  Goal: Prevent transmission of infection  Outcome: Ongoing     Problem: Falls - Risk of:  Goal: Will remain free from falls  Description: Will remain free from falls  Outcome: Ongoing  Note: Fall risk assessment completed every shift. All precautions in place. Pt has call light within reach at all times. Room clear of clutter. Pt aware to call for assistance when getting up. Patient assessed for fall risk; fall precautions initiated. Patient and family instructed about safety devices. Environment kept free of clutter and adequate lighting provided. Bed locked and in lowest position. Call light within reach. Will continue to monitor.

## 2022-01-16 NOTE — PROGRESS NOTES
Hospitalist Progress Note      PCP: Leonie Peters DO    Date of Admission: 1/14/2022    Chief Complaint:   SOB    Hospital Course:   Isabell Marroquin is a 78 y.o. male who presented to [facility] with 14 days of malaise/ generalised weakness and myalgias assoc with anorexia. In the last 4 days he developed a cough with yellowish sputum and fevers. He has anosmia/ taste normal. No chest pain. No abdo pain/N/V/D  No urinary symptoms. Positive covid contact with wife who hd Covid.      Wife is requesting Dr Deniz Matos order monoclonal infusion for the patient- we told him monoclonal AB are for out patients at risk of deteriorating/ to help prevent hospitalisation. He is out of the AB window. Subjective:   Feeling and breathing easier  No F/C  No CP       Medications:  Reviewed    Infusion Medications    sodium chloride Stopped (01/15/22 1347)     Scheduled Medications    dexamethasone  10 mg IntraVENous Q24H    Followed by   Oscar Alexander ON 1/19/2022] dexamethasone  10 mg IntraVENous Q24H    levofloxacin  500 mg IntraVENous Q24H    metoprolol tartrate  50 mg Oral Daily    sodium chloride flush  5-40 mL IntraVENous 2 times per day    aspirin  81 mg Oral Daily    enoxaparin  30 mg SubCUTAneous BID     PRN Meds: sodium chloride flush, sodium chloride, ondansetron **OR** ondansetron, acetaminophen **OR** acetaminophen, polyethylene glycol      Intake/Output Summary (Last 24 hours) at 1/16/2022 1206  Last data filed at 1/16/2022 0348  Gross per 24 hour   Intake 746.25 ml   Output --   Net 746.25 ml       Physical Exam Performed:    BP (!) 154/77   Pulse 65   Temp 97.6 °F (36.4 °C) (Oral)   Resp 20   Ht 6' 1\" (1.854 m)   Wt 192 lb 0.3 oz (87.1 kg)   SpO2 92%   BMI 25.33 kg/m²        General appearance:  Anxious. No apparent distress, appears stated age and cooperative. HEENT:  Normal cephalic, atraumatic without obvious deformity. Pupils equal, round, and reactive to light.   Extra ocular muscles intact. Conjunctivae/corneas clear. Neck: Supple, with full range of motion. No jugular venous distention. Trachea midline. Respiratory:  Normal respiratory effort. Bilateral crackles. Cardiovascular:  Regular rate and rhythm with normal S1/S2 without murmurs, rubs or gallops. Abdomen: Soft, non-tender, non-distended with normal bowel sounds. Musculoskeletal:  No clubbing, cyanosis or edema bilaterally. Full range of motion without deformity. Skin: Skin color, texture, turgor normal.  No rashes or lesions. Neurologic:  Neurovascularly intact without any focal sensory/motor deficits. Cranial nerves: II-XII intact, grossly non-focal.  Psychiatric:  Alert and oriented, thought content appropriate, normal insight  Capillary Refill: Brisk,3 seconds, normal  Peripheral Pulses: +2 palpable, equal bilaterally        Labs:   Recent Labs     01/14/22  1131 01/15/22  0702   WBC 4.7 2.0*   HGB 15.4 14.9   HCT 45.6 43.8    136     Recent Labs     01/14/22  1131      K 4.8   CL 99   CO2 25   BUN 18   CREATININE 0.8   CALCIUM 8.2*     Recent Labs     01/14/22  1131   AST 97*   ALT 52*   BILITOT 1.2*   ALKPHOS 387*     No results for input(s): INR in the last 72 hours. Recent Labs     01/14/22  1750 01/14/22  2120 01/15/22  0702   TROPONINI <0.01 <0.01 <0.01       Urinalysis:      Lab Results   Component Value Date    BLOODU small 01/17/2018    SPECGRAV 1.025 01/17/2018    GLUCOSEU neg 01/17/2018       Radiology:  XR CHEST PORTABLE   Final Result   Equivocal peripheral ground-glass pulmonary infiltrates bilaterally. Cardiomegaly without overt failure. ASSESSMENT:  1. Acute hypoxic respiratory failure sec to 2- unresolved. Inflammatory markers improving. 2. Covid 19 pneumonia with resp compromise  3. Anxiety        PLAN:  1.  Continue on the Covid 19 pathway;  -wean off suppl oxygen as tolerated.  -crp/procal/cpk/d-dimer  -supplemental oxygen, keep pulse ox >90%  -urine legio/strepn Ag  -decadron 20mg iv x 5 days then de-escalate to 10mg iv qd  -pharm to help with Toci v baci therapy  2. Albuterol inh prn        DVT Prophylaxis: lovenox  Diet: Diet NPO Effective Now  Code Status: No Order     PT/OT Eval Status: none     Dispo - hopefully home in am if weaned off oxygen.     Debbie Larios MD

## 2022-01-16 NOTE — PROGRESS NOTES
Pulmonary/Critical Care Progress Note    CC:  Follow-up:  Acute hypoxemic respiratory failure with SPO2 <90% on room air  COVID 19 pneumonia  PAULINA with CPAP    Subjective:  Remains on 2-3L NC  Feeling a bit better  Feels fatigued and weak  Breathing about the same  + runny nose    ROS  Some coughing  Some SOB with activity  Weakness    Intake/Output Summary (Last 24 hours) at 1/16/2022 0929  Last data filed at 1/16/2022 0348  Gross per 24 hour   Intake 1046.25 ml   Output --   Net 1046.25 ml         PHYSICAL EXAM:  Blood pressure 128/62, pulse 78, temperature 98.2 °F (36.8 °C), temperature source Oral, resp. rate 18, height 6' 1\" (1.854 m), weight 192 lb 0.3 oz (87.1 kg), SpO2 94 %.'  Gen: No distress. Well developed, well-nourished. Appears slightly tachypneic  Eyes: No scleral icterus. No conjunctival injection. ENT: External appearance of ears and nose normal.  Neck: Trachea midline. No obvious mass. No visible thyroid enlargement     Respiratory: No crackles. No wheezes. No rhonchi. No accessory muscle use  Cardiovascular: Regular rate. Regular rhythm. No murmur or rub. No edema  GI: Non-tender. Non-distended. No hernia. Bowel sounds present  Skin: Warm, dry, normal texture and turgor. No abnormalities on exposed extremities. Musculoskeletal: No cyanosis. No clubbing. No joint deformity. Neuro: Moves all four extremities.    Psychiatric:  Normal mood and affect; exhibits normal insight and judgement     Medications:    Scheduled Meds:   dexamethasone  10 mg IntraVENous Q24H    Followed by   Crissy Franco ON 1/19/2022] dexamethasone  10 mg IntraVENous Q24H    levofloxacin  500 mg IntraVENous Q24H    metoprolol tartrate  50 mg Oral Daily    sodium chloride flush  5-40 mL IntraVENous 2 times per day    aspirin  81 mg Oral Daily    enoxaparin  30 mg SubCUTAneous BID       Continuous Infusions:   sodium chloride Stopped (01/15/22 1347)       PRN Meds:  sodium chloride flush, sodium chloride, ondansetron **OR** ondansetron, acetaminophen **OR** acetaminophen, polyethylene glycol    Labs:  CBC:   Recent Labs     01/14/22  1131 01/15/22  0702   WBC 4.7 2.0*   HGB 15.4 14.9   HCT 45.6 43.8   MCV 85.5 85.2    136     BMP:   Recent Labs     01/14/22  1131      K 4.8   CL 99   CO2 25   BUN 18   CREATININE 0.8     LIVER PROFILE:   Recent Labs     01/14/22  1131   AST 97*   ALT 52*   BILITOT 1.2*   ALKPHOS 387*     PT/INR: No results for input(s): PROTIME, INR in the last 72 hours. APTT: No results for input(s): APTT in the last 72 hours. UA:No results for input(s): NITRITE, COLORU, PHUR, LABCAST, WBCUA, RBCUA, MUCUS, TRICHOMONAS, YEAST, BACTERIA, CLARITYU, SPECGRAV, LEUKOCYTESUR, UROBILINOGEN, BILIRUBINUR, BLOODU, GLUCOSEU, AMORPHOUS in the last 72 hours. Invalid input(s): Wilburt Orn  No results for input(s): PH, PCO2, PO2 in the last 72 hours. Films:  Chest imaging and associated reports were personally reviewed and showed CXR 1/14:  Equivocal peripheral ground-glass pulmonary infiltrates bilaterally. Cardiomegaly without overt failure. ABG:  No study    Cultures:  Blood: no growth  Urine:  Sputum:  Strep/Legionella Antigens: negative  MRSA probe:  Respiratory Viral Panel/Influenza: C. Diff:   COVID19: positive    ECHO  No recent study    PFTs  None    Assessment/Plan:     Acute hypoxemic respiratory failure with SPO2 <90% on room air  Maintain oxygen saturations >90% with supplemental oxygen and wean as tolerated  encourage self proning    COVID 19 pneumonia  S/p actemra  Decadron 10mg daily  Possible bacterial component with PCT of 0.17, continue levaquin and monitor PCT. Improved today, would treat empirically with levaquin for 5 total days  encourage self proning    PAULINA with CPAP  Home CPAP if able    PTOT     DVT prophylaxis with lovenox    Thank you for allowing me to participate in the care of this patient. Will follow.     Maris Fountain, ACNP  [facility], Sleep, and Critical Care

## 2022-01-17 VITALS
WEIGHT: 192.02 LBS | HEART RATE: 66 BPM | OXYGEN SATURATION: 94 % | RESPIRATION RATE: 22 BRPM | DIASTOLIC BLOOD PRESSURE: 74 MMHG | HEIGHT: 73 IN | BODY MASS INDEX: 25.45 KG/M2 | TEMPERATURE: 97.7 F | SYSTOLIC BLOOD PRESSURE: 138 MMHG

## 2022-01-17 LAB — PROCALCITONIN: 0.08 NG/ML (ref 0–0.15)

## 2022-01-17 PROCEDURE — 99232 SBSQ HOSP IP/OBS MODERATE 35: CPT | Performed by: INTERNAL MEDICINE

## 2022-01-17 PROCEDURE — 84145 PROCALCITONIN (PCT): CPT

## 2022-01-17 PROCEDURE — 6370000000 HC RX 637 (ALT 250 FOR IP): Performed by: NURSE PRACTITIONER

## 2022-01-17 PROCEDURE — 36415 COLL VENOUS BLD VENIPUNCTURE: CPT

## 2022-01-17 PROCEDURE — 6370000000 HC RX 637 (ALT 250 FOR IP): Performed by: INTERNAL MEDICINE

## 2022-01-17 PROCEDURE — 6360000002 HC RX W HCPCS: Performed by: INTERNAL MEDICINE

## 2022-01-17 RX ORDER — DEXAMETHASONE 6 MG/1
6 TABLET ORAL 2 TIMES DAILY WITH MEALS
Qty: 14 TABLET | Refills: 0 | Status: SHIPPED | OUTPATIENT
Start: 2022-01-17 | End: 2022-01-24

## 2022-01-17 RX ORDER — LEVOFLOXACIN 500 MG/1
750 TABLET, FILM COATED ORAL DAILY
Qty: 6 TABLET | Refills: 0 | Status: SHIPPED | OUTPATIENT
Start: 2022-01-18 | End: 2022-01-22

## 2022-01-17 RX ORDER — LEVOFLOXACIN 500 MG/1
500 TABLET, FILM COATED ORAL DAILY
Status: DISCONTINUED | OUTPATIENT
Start: 2022-01-17 | End: 2022-01-17 | Stop reason: HOSPADM

## 2022-01-17 RX ORDER — ASPIRIN 81 MG/1
81 TABLET, CHEWABLE ORAL DAILY
Qty: 30 TABLET | Refills: 3 | Status: SHIPPED | OUTPATIENT
Start: 2022-01-18

## 2022-01-17 RX ADMIN — METOPROLOL TARTRATE 50 MG: 50 TABLET, FILM COATED ORAL at 08:57

## 2022-01-17 RX ADMIN — ASPIRIN 81 MG 81 MG: 81 TABLET ORAL at 08:57

## 2022-01-17 RX ADMIN — ENOXAPARIN SODIUM 30 MG: 100 INJECTION SUBCUTANEOUS at 08:57

## 2022-01-17 RX ADMIN — LEVOFLOXACIN 500 MG: 500 TABLET, FILM COATED ORAL at 12:22

## 2022-01-17 ASSESSMENT — PAIN SCALES - GENERAL
PAINLEVEL_OUTOF10: 0
PAINLEVEL_OUTOF10: 0

## 2022-01-17 NOTE — CARE COORDINATION
Await home O2 eval. [facility] notified of possible need for home oxygen.    Electronically signed by Amol Sanchez RN Case Management [phone] on 1/17/2022 at 3:33 PM

## 2022-01-17 NOTE — ACP (ADVANCE CARE PLANNING)
Advance Care Planning     Advance Care Planning Activator (Inpatient)  Conversation Note      Date of ACP Conversation: 1/17/2022     Conversation Conducted with: Patient with Decision Making Capacity    ACP Activator: Abdulaziz Lazo RN    Health Care Decision Maker:     Current Designated Health Care Decision Maker:     Primary Decision Maker: [facility] - [phone]      Care Preferences    Ventilation: \"If you were in your present state of health and suddenly became very ill and were unable to breathe on your own, what would your preference be about the use of a ventilator (breathing machine) if it were available to you? \"      Would the patient desire the use of ventilator (breathing machine)?: yes    \"If your health worsens and it becomes clear that your chance of recovery is unlikely, what would your preference be about the use of a ventilator (breathing machine) if it were available to you? \"     Would the patient desire the use of ventilator (breathing machine)?: Unsure      Resuscitation  \"CPR works best to restart the heart when there is a sudden event, like a heart attack, in someone who is otherwise healthy. Unfortunately, CPR does not typically restart the heart for people who have serious health conditions or who are very sick. \"    \"In the event your heart stopped as a result of an underlying serious health condition, would you want attempts to be made to restart your heart (answer \"yes\" for attempt to resuscitate) or would you prefer a natural death (answer \"no\" for do not attempt to resuscitate)? \" yes       [] Yes   [x] No   Educated Patient / Libby Kebede regarding differences between Advance Directives and portable DNR orders.     Length of ACP Conversation in minutes:  5 minutes    Conversation Outcomes:  [x] ACP discussion completed  [] Existing advance directive reviewed with patient; no changes to patient's previously recorded wishes  [] New Advance Directive completed  []

## 2022-01-17 NOTE — DISCHARGE INSTR - COC
Continuity of Care Form    Patient Name: Joy Hilario   :  1945  MRN:  [id number]    Admit date:  2022  Discharge date:  ***    Code Status Order: Full Code   Advance Directives:      Admitting Physician:  Ana Blue MD  PCP: Torrie Espinosa DO    Discharging Nurse: [facility] Unit/Room#: F1X-7246/4317-38  Discharging Unit Phone Number: ***    Emergency Contact:   Extended Emergency Contact Information  Primary Emergency Contact: Benjy Marroquin  Address: [address] [place] [address] Phone: [phone]  Relation: Spouse    Past Surgical History:  History reviewed. No pertinent surgical history.     Immunization History:   Immunization History   Administered Date(s) Administered    Influenza Virus Vaccine 2014, 2015    Pneumococcal Conjugate 13-valent (Xshreuf28) 2015    Pneumococcal Polysaccharide (Xvruedjlf43) 2013    Tdap (Boostrix, Adacel) 2013    Zoster Live (Zostavax) 2014    Zoster Recombinant (Shingrix) 2020       Active Problems:  Patient Active Problem List   Diagnosis Code    Palpitations R00.2    Hyperlipidemia E78.5    PAULINA (obstructive sleep apnea) G47.33    Pneumonia due to COVID-19 virus U07.1, J12.82    Hypoxemia R09.02    COVID-19 U07.1    PAULINA on CPAP G47.33, Z99.89       Isolation/Infection:   Isolation            Droplet Plus  Droplet Plus          Patient Infection Status       Infection Onset Added Last Indicated Last Indicated By Review Planned Expiration Resolved Resolved By    COVID-19 22 COVID-19, Rapid 22      Resolved    COVID-19 (Rule Out) 22 COVID-19, Rapid (Ordered)   22 Rule-Out Test Resulted            Nurse Assessment:  Last Vital Signs: BP (!) 149/76   Pulse 64   Temp 97.8 °F (36.6 °C) (Oral)   Resp 20   Ht 6' 1\" (1.854 m)   Wt 192 lb 0.3 oz (87.1 kg)   SpO2 95%   BMI 25.33 kg/m²     Last documented pain score (0-10 scale): Pain Level: 0  Last Weight:   Wt Readings from Last 1 Encounters:   22 192 lb 0.3 oz (87.1 kg)     Mental Status:  {IP PT MENTAL STATUS:}    IV Access:  { VIRGILIO IV ACCESS:034861017}    Nursing Mobility/ADLs:  Walking   {CHP DME COXK:620606280}  Transfer  {CHP DME FPOF:385557753}  Bathing  {CHP DME CGDW:686751906}  Dressing  {CHP DME ZCFT:558376981}  Toileting  {CHP DME XPTR:875704515}  Feeding  {CHP DME PHOI:863670689}  Med Admin  {CHP DME BYXU:965763008}  Med Delivery   { VIRGILIO MED Delivery:665371353}    Wound Care Documentation and Therapy:        Elimination:  Continence: Bowel: {YES / SH:67775}  Bladder: {YES / EL:74481}  Urinary Catheter: {Urinary Catheter:477424296}   Colostomy/Ileostomy/Ileal Conduit: {YES / EZ:36737}       Date of Last BM: ***    Intake/Output Summary (Last 24 hours) at 2022 1458  Last data filed at 2022 0409  Gross per 24 hour   Intake 360 ml   Output --   Net 360 ml     I/O last 3 completed shifts:   In: 5 [P.O.:840]  Out: -     Safety Concerns:     [employer] Safety Concerns:337532321}    Impairments/Disabilities:      [employer] Impairments/Disabilities:814919460}    Nutrition Therapy:  Current Nutrition Therapy:   [employer] Diet List:885331522}    Routes of Feeding: {CHP DME Other Feedings:218306898}  Liquids: {Slp liquid thickness:29262}  Daily Fluid Restriction: {CHP DME Yes amt example:910834381}  Last Modified Barium Swallow with Video (Video Swallowing Test): {Done Not Done RPWA:504917234}    Treatments at the Time of Hospital Discharge:   Respiratory Treatments: ***  Oxygen Therapy:  {Therapy; copd oxygen:54033}  Ventilator:    { CC Vent ESLF:570651244}    Rehab Therapies: {THERAPEUTIC INTERVENTION:3169209009}  Weight Bearing Status/Restrictions: [address] HALL Weight Bearin}  Other Medical Equipment (for information only, NOT a DME order):  {EQUIPMENT:207767978}  Other Treatments: ***    Patient's personal belongings (please select all that are sent with patient):  {[facility] DME Belongings:405665382}    RN SIGNATURE:  {Esignature:277338474}    CASE MANAGEMENT/SOCIAL WORK SECTION    Inpatient Status Date: ***    Readmission Risk Assessment Score:  Readmission Risk              Risk of Unplanned Readmission:  9           Discharging to Facility/ Agency   Name:   Address:  Phone:  Fax:    Dialysis Facility (if applicable)   Name:  Address:  Dialysis Schedule:  Phone:  Fax:    / signature: {Esignature:596865724}    PHYSICIAN SECTION    Prognosis: Fair    Condition at Discharge: Stable    Rehab Potential (if transferring to Rehab): Fair    Recommended Labs or Other Treatments After Discharge: none    Physician Certification: I certify the above information and transfer of Joy Hilario  is necessary for the continuing treatment of the diagnosis listed and that he requires Home Care for less 30 days.      Update Admission H&P: {[facility] DME Changes in LYRXK:340230691}    PHYSICIAN SIGNATURE:  Electronically signed by Gordo Suggs MD on 1/17/22 at 2:59 PM EST

## 2022-01-17 NOTE — CARE COORDINATION
[employer] rep delivered an E-tank to the RN and reviewed insurance coverage, equipment set up, supply reorder process, oxygen safety, and Home O2 delivery & rental process with patient via phone due to patient's isolation status. Written info on the above topics was provided to the patient. Patient will need to call [employer]aya[employer] at [phone] when leaving hospital in order to activate delivery of Concentrator to pt's home. Notified RN.     Thank you for the referral.  Electronically signed by Piotr Aviles on 1/17/2022 at 5:05 PM  Cell ph# [phone]

## 2022-01-17 NOTE — PROGRESS NOTES
Speech Language Pathology    Speech Therapy Note regarding dysphagia    · SLP met with pt for dysphagia treatment f/u at 1423 pm 1/17/2022  · Pt was up in a chair; verbally responsive and following commands  · Pt reports doing much better; ding well with diet and just ate a good lunch  · Pt denies need for f/u for any food items as just ate and doing well  · Pt did take some thin liquids without complaints of s/s  · Pt reporting may be discharged soon  · SLP offered referral for [facility] for dysphagia f/u. Pt declined need but was acceptable for SLP f/u at a meal tomorrow if still hospitalized    Plan : f/u 1/18/2022 at am meal unless otherwise notified    Non-billable time 7 minutes    signed  Dutch Ewing. LillyMS,CCC,SLP 3314  Speech and Language Pathologist  1/17/2022 8316

## 2022-01-17 NOTE — PROGRESS NOTES
Pulmonary Progress Note    Date of Admission: 1/14/2022   LOS: 3 days       CC:  covid   Subjective:  Feeling better  Being discharged     ROS:   No nausea  No Vomiting  No chest pain       Assessment:          Plan: This note may have been transcribed using [employer]. Please disregard any translational errors. Hospital Day: 3     Acute hypoxemic respiratory failure with SPO2 <90% on room air  Weaned to 1 L   Agree with d/c   COVID 19 pneumonia  S/p actemra  D/c today      PAULINA with CPAP  Home CPAP if able      Will sign off at this time. Thanks for the consult. Please call with questions. Data:        PHYSICAL EXAM:   Blood pressure 138/74, pulse 66, temperature 97.7 °F (36.5 °C), temperature source Oral, resp. rate 22, height 6' 1\" (1.854 m), weight 192 lb 0.3 oz (87.1 kg), SpO2 94 %.'  Body mass index is 25.33 kg/m². Gen: No distress. ENT:   Resp: No accessory muscle use. No crackles. No wheezes. No rhonchi. CV: Regular rate. Regular rhythm. No murmur or rub. No edema. Skin: Warm, dry, normal texture and turgor. No nodule on exposed extremities. M/S: No cyanosis. No clubbing. No joint deformity. Psych: Oriented x 3. No anxiety. Awake. Alert. Intact judgement and insight. Good Mood / Affect.   Memory appears in tact       Medications:    Scheduled Meds:   levoFLOXacin  500 mg Oral Daily    dexamethasone  10 mg IntraVENous Q24H    Followed by   Jax Holter ON 1/19/2022] dexamethasone  10 mg IntraVENous Q24H    metoprolol tartrate  50 mg Oral Daily    sodium chloride flush  5-40 mL IntraVENous 2 times per day    aspirin  81 mg Oral Daily    enoxaparin  30 mg SubCUTAneous BID       Continuous Infusions:   sodium chloride Stopped (01/15/22 1347)       PRN Meds:  sodium chloride flush, sodium chloride, ondansetron **OR** ondansetron, acetaminophen **OR** acetaminophen, polyethylene glycol    Labs reviewed:  CBC:   Recent Labs     01/15/22  0702   WBC 2.0*   HGB 14.9 HCT 43.8   MCV 85.2        BMP: No results for input(s): NA, K, CL, CO2, PHOS, BUN, CREATININE, CA in the last 72 hours. LIVER PROFILE: No results for input(s): AST, ALT, LIPASE, BILIDIR, BILITOT, ALKPHOS in the last 72 hours. Invalid input(s): AMYLASE,  ALB  PT/INR: No results for input(s): PROTIME, INR in the last 72 hours. APTT: No results for input(s): APTT in the last 72 hours. UA:No results for input(s): NITRITE, COLORU, PHUR, LABCAST, WBCUA, RBCUA, MUCUS, TRICHOMONAS, YEAST, BACTERIA, CLARITYU, SPECGRAV, LEUKOCYTESUR, UROBILINOGEN, BILIRUBINUR, BLOODU, GLUCOSEU, AMORPHOUS in the last 72 hours. Invalid input(s): [facility]  No results for input(s): PH, PCO2, PO2 in the last 72 hours. Cx:      Films: This note was transcribed using [employer]. Please disregard any translational errors.       Jodee Villalta Pulmonary, Sleep and [facility] [phone]

## 2022-01-17 NOTE — CARE COORDINATION
Alvaro received referral from RN-CM for possible HOME OXYGEN      Will need:  Qualifying O2 SATS TEST  DME ORDERS     [employer] rep will verify patient's insurance and once DME Orders are received, [employer] rep will follow up with patient prior to discharge to deliver Oxygen Equipment.     Thank you for the referral.  Electronically signed by Fredricka Scheuermann on 1/17/2022 at 3:37 PM  Cell ph# [phone]

## 2022-01-17 NOTE — CARE COORDINATION
INITIAL CASE MANAGEMENT ASSESSMENT    Unable to meet with patient due to isolation status. Call to patient's room, spoke with patient over the phone to assess possible discharge needs. Explained Case Management role/services. Living Situation: Confirmed address, lives with wife & grandson in a house, stays on main level, 2 steps to enter    ADLs: Independent, shares homemaking duties with wife     DME: None    PT/OT Recs: Home with assist PRN (not further therapy needs at discharge)      Active Services: None     Transportation: Active , wife transport home     Medications: Uses [employer] in [facility] -- no issues    PCP: Maria Alejandra Sorensen Do      HD/PD: n/a    PLAN/COMMENTS: Patient will return home with family. No anticipated home needs. Watch oxygen, on 3L O2. CM provided contact information for patient or family to call with any questions. CM will follow and assist as needed.   Electronically signed by Rm Delgado RN Case Management [phone] on 1/17/2022 at 1:24 PM

## 2022-01-17 NOTE — DISCHARGE SUMMARY
Hospital Medicine Discharge Summary    Patient: Joy Hilario     Gender: male  : 1945   Age: 78 y.o. MRN: [id number]    Admitting Physician: Ana Blue MD  Discharge Physician: Gordo Suggs MD     Code Status: Prior     Admit Date: 2022   Discharge Date:   22    Disposition:  Home    Discharge Diagnoses:  1. Acute hypoxic respiratory failure sec to 2- unresolved. Inflammatory markers improving. 2. Covid 19 pneumonia with resp compromise  3. Anxiety      Follow-up appointments:  two weeks    Outpatient to do list: self isolate/ wear mask    Condition at Discharge:  Stable    Hospital Course:   Paulina Jett is a 69 y. o. male who presented to [facility] with 14 days of malaise/ generalised weakness and myalgias assoc with anorexia. In the 4 days prior he developed a cough with yellowish sputum and fevers assoc with anosmia but normal taste and without  chest pain, abdo pain/N/V/D or urinary symptoms. He had a Positive covid contact with wife who hd Covid. He also now tested positive. His wife was requesting Dr De La Fuente Channel order monoclonal infusion for the patient- we told her monoclonal AB are for out patients at risk of deteriorating/ to help prevent hospitalisation. He is out of the antibody window. He was treated with steroids and discharged with home oxygen therapy, antibiotics to cover possible bacterial superinfection and decadron.     Discharge Medications:   Discharge Medication List as of 2022  5:12 PM      START taking these medications    Details   levoFLOXacin (LEVAQUIN) 500 MG tablet Take 1.5 tablets by mouth daily for 4 doses, Disp-6 tablet, R-0Normal      dexamethasone (DECADRON) 6 MG tablet Take 1 tablet by mouth 2 times daily (with meals) for 7 days, Disp-14 tablet, R-0Normal      aspirin 81 MG chewable tablet Take 1 tablet by mouth daily, Disp-30 tablet, R-3Normal           Discharge Medication List as of 2022  5:12 PM        Discharge Medication List as of 1/17/2022  5:12 PM      CONTINUE these medications which have NOT CHANGED    Details   metoprolol tartrate (LOPRESSOR) 50 MG tablet TAKE ONE TABLET BY MOUTH DAILY, Disp-90 tablet, R-0Normal           Discharge Medication List as of 1/17/2022  5:12 PM          Discharge ROS:  A complete review of systems was asked and negative except for above     Discharge Exam:    /74   Pulse 66   Temp 97.7 °F (36.5 °C) (Oral)   Resp 22   Ht 6' 1\" (1.854 m)   Wt 192 lb 0.3 oz (87.1 kg)   SpO2 94%   BMI 25.33 kg/m²     General appearance:  Anxious. No apparent distress, appears stated age and cooperative. HEENT:  Normal cephalic, atraumatic without obvious deformity. Pupils equal, round, and reactive to light.  Extra ocular muscles intact. Conjunctivae/corneas clear. Neck: Supple, with full range of motion. No jugular venous distention. Trachea midline. Respiratory:  Normal respiratory effort. Few bilateral crackles. no conversational dyspnea  Cardiovascular:  Regular rate and rhythm with normal S1/S2 without murmurs, rubs or gallops. Abdomen: Soft, non-tender, non-distended with normal bowel sounds. Musculoskeletal:  No clubbing, cyanosis or edema bilaterally.  Full range of motion without deformity. Skin: Skin color, texture, turgor normal.  No rashes or lesions. Neurologic:  Neurovascularly intact without any focal sensory/motor deficits. Cranial nerves: II-XII intact, grossly non-focal.  Psychiatric:  Alert and oriented, thought content appropriate, normal insight  Capillary Refill: Brisk,3 seconds, normal  Peripheral Pulses: +2 palpable, equal bilaterally       Labs:  For convenience and continuity at follow-up the following most recent labs are provided:    Lab Results   Component Value Date    WBC 2.0 01/15/2022    HGB 14.9 01/15/2022    HCT 43.8 01/15/2022    MCV 85.2 01/15/2022     01/15/2022     01/14/2022    K 4.8 01/14/2022    CL 99 01/14/2022    CO2 25 01/14/2022 BUN 18 01/14/2022    CREATININE 0.8 01/14/2022    CALCIUM 8.2 01/14/2022    ALKPHOS 387 01/14/2022    ALT 52 01/14/2022    AST 97 01/14/2022    BILITOT 1.2 01/14/2022    LABALBU 3.1 01/14/2022    LDLCALC 135 04/15/2021    TRIG 180 04/15/2021     No results found for: INR    Radiology:  XR CHEST PORTABLE    Result Date: 1/14/2022  EXAMINATION: ONE XRAY VIEW OF THE CHEST 1/14/2022 11:42 am COMPARISON: None. HISTORY: ORDERING SYSTEM PROVIDED HISTORY: SOB TECHNOLOGIST PROVIDED HISTORY: Reason for exam:->SOB Reason for Exam: Fatigue; Concern For COVID-19; Shortness of Breath; Cough FINDINGS: The cardiac silhouette is enlarged. There are questionable areas of ground-glass opacification in the axillary portion of the right lung and mid left lung. Findings may be artifactual and related to overlying soft tissues. The lungs otherwise are clear. No pleural fluid. Moderate dextroscoliosis of the thoracic spine. Equivocal peripheral ground-glass pulmonary infiltrates bilaterally. Cardiomegaly without overt failure. EKG none      The patient was seen and examined on day of discharge and this discharge summary is in conjunction with any daily progress note from day of discharge. Time Spent on discharge is 30 minutes  in the examination, evaluation, counseling and review of medications and discharge plan. Note that more than 30 minutes was spent in preparing discharge papers, discussing discharge with patient, medication review, etc.       Signed:    Greta Shukla MD   1/17/2022      Thank you Stormy Vang DO for the opportunity to be involved in this patient's care.  If you have any questions or concerns please feel free to contact me at [facility]

## 2022-01-18 ENCOUNTER — CARE COORDINATION (OUTPATIENT)
Dept: CASE MANAGEMENT | Age: 77
End: 2022-01-18

## 2022-01-18 DIAGNOSIS — R00.2 PALPITATIONS: ICD-10-CM

## 2022-01-18 LAB
BLOOD CULTURE, ROUTINE: NORMAL
CULTURE, BLOOD 2: NORMAL

## 2022-01-18 NOTE — TELEPHONE ENCOUNTER
Please have patient schedule a follow up when possible. O2 at 83 percent is too low. If breathing becomes worse and labored please be evaluated in the ER.

## 2022-01-18 NOTE — CARE COORDINATION
Transitions of Care Call  Call within 2 business days of discharge: Yes    Patient: Shelbi Horne Patient : 1945   MRN: [id number]  Reason for Admission: PNA d/t COVID+  Discharge Date: 22 RARS: Readmission Risk Score: 4 ( )      Last Discharge [facility]       Complaint Diagnosis Description Type Department Provider    22 Fatigue; Concern For COVID-19; Shortness of Breath; Cough Hypoxemia . .. ED to Hosp-Admission (Discharged) (ADMITTED) Ny Boswell MD          Was this an external facility discharge? No Discharge Facility: NA    Challenges to be reviewed by the provider   Additional needs identified to be addressed with provider: Yes  O2 sats at night                 Encounter was routed to provider for escalation. Method of communication with provider: chart routing. Discussed COVID-19 related testing which was: available at this time. Test results were: positive. Patient informed of results, if available? Yes. Current Symptoms: fatigue, cough, shortness of breath and low O2 sats at night    Reviewed New or Changed Meds: yes    Do you have what you need at home?  Durable Medical Equipment ordered at discharge: [facility]/Outpatient orders at discharge: none   Was patient discharged with a pulse oximeter? No Discussed and confirmed pulse oximeter discharge instructions and when to notify provider or seek emergency care. Patient education provided: Reviewed appropriate site of care based on symptoms and resources available to patient including: PCP. Follow up appointment scheduled within 7 days of discharge: no. If no appointment scheduled, scheduling offered: yes  No future appointments.     Interventions: Scheduled appointment with PCP-route note to PCP r/t HFU  Obtained and reviewed discharge summary and/or continuity of care documents  Education of patient/family/caregiver/guardian to support self-management-reviewed O2 monitoring, O2 use, hydration, rest  Assessment and support for treatment adherence and medication management-reviewed medications  route note to PCP r/t patient report that O2 is 82% in AM with CPAP and O2 use  Reviewed discharge instructions, medical action plan and red flags with patient who verbalized understanding. Patient reports weakness, fatigue. Concerned about length of recovery. Reports O2 sats @ 82% this AM after using CPAP and O2 at night. Routed note to PCP. Declined HFU assistance at this time. Plan for follow-up call in 3-5 days based on severity of symptoms and risk factors. Plan for next call: symptom management-SOB, cough, fatigue, appetite  self management-CPAP, O2 use, hydration  follow up appointment-HFU  medication management-any changes  Provided contact information for future needs.     Octavio Fuentes LPN

## 2022-01-19 RX ORDER — METOPROLOL TARTRATE 50 MG/1
TABLET, FILM COATED ORAL
Qty: 90 TABLET | Refills: 0 | Status: SHIPPED | OUTPATIENT
Start: 2022-01-19 | End: 2022-02-16 | Stop reason: SDUPTHER

## 2022-01-20 NOTE — TELEPHONE ENCOUNTER
Pt to increase O2 at night when using his CPAP, rest, fluids, keep track of O2 sats.     Call if anything changes

## 2022-01-21 ENCOUNTER — CARE COORDINATION (OUTPATIENT)
Dept: CASE MANAGEMENT | Age: 77
End: 2022-01-21

## 2022-01-21 NOTE — CARE COORDINATION
Patient contacted regarding COVID-19 diagnosis. Care Transition Nurse contacted the patient by telephone to perform follow-up assessment. Symptoms reviewed with patient who verbalized the following symptoms:  no worsening symptoms. Patient continues to have productive cough with yellow tinged sputum. Wearing oxygen as directed and saturation \"back to normal\". Denied any chest pain and feels that breathing is better than when he was first discharged home. Due to no new or worsening symptoms encounter was not routed to provider for escalation. Patient was given an opportunity to verbalize any questions and concerns and agrees to contact the CTN with further questions or concerns. Appreciative of information, transition support, and encouragement during his covid recovery. Denies needs at this time. CTN provided contact information for future reference. Plan for follow-up call in 5-7 days based on severity of symptoms and risk factors.      Zachary Macias RN  Care Transition Nurse  [phone] mobile

## 2022-01-26 ENCOUNTER — TELEPHONE (OUTPATIENT)
Dept: FAMILY MEDICINE CLINIC | Age: 77
End: 2022-01-26

## 2022-01-26 NOTE — TELEPHONE ENCOUNTER
----- Message from Armando Saldaña sent at 1/26/2022 12:00 PM EST -----  Subject: Appointment Request    Reason for Call: Routine Hospital Follow Up    QUESTIONS  Type of Appointment? Established Patient  Reason for appointment request? No appointments available during search  Additional Information for Provider? Pt would like have an hosp f/u next   week. Pt tested positive fore Covid 1/14. Pt would like to have an in   person visit and not VV.   ---------------------------------------------------------------------------  --------------  CALL BACK INFO  What is the best way for the office to contact you? OK to leave message on   voicemail  Preferred Call Back Phone Number? [phone]  ---------------------------------------------------------------------------  --------------  SCRIPT ANSWERS  Relationship to Patient? Self  (Patient requests to see provider urgently. )? No  (Has the patient been discharged from the hospital within 2 business days   AND does not have a Telephone Encounter  Follow Up From [address] [place] [address]   documented in [address]?)? No  Do you have any questions for your primary care provider that need to be   answered prior to your appointment? (Use RN Triage if question pertains to   anything on the red flag list)? No  (Patient needs follow up visit after hospital discharge) Book first   available appointment within 7 days OF DISCHARGE, if no appt, proceed to   book the next available time slot within 14 days OF DISCHARGE AND Send   Message to Provider. [facility] Follow Up appointment cannot be booked   beyond 14 Days and should result in a Message to Provider. ? Yes   Have you been diagnosed with, awaiting test results for, or told that you   are suspected of having COVID-19 (Coronavirus)? (If patient has tested   negative or was tested as a requirement for work, school, or travel and   not based on symptoms, answer no)?  Yes  Did your symptoms begin within the past 14 days or was your positive test   result within the past 14 days?  Yes

## 2022-01-26 NOTE — TELEPHONE ENCOUNTER
If patient is past 14 days with symptoms and no longer has a fever and symptoms have improved yes. We can try to get him in tomorrow or Friday.

## 2022-01-28 ENCOUNTER — CARE COORDINATION (OUTPATIENT)
Dept: CASE MANAGEMENT | Age: 77
End: 2022-01-28

## 2022-01-28 NOTE — CARE COORDINATION
Patient contacted regarding COVID-19 diagnosis. Discussed COVID-19 related testing which was available at this time. Test results were positive. Patient informed of results, if available? Yes    LPN Care Coordinator contacted the patient by telephone to perform follow-up assessment. Verified name and  with patient as identifiers. Patient has following risk factors of: pneumonia and acute respiratory failure. Symptoms reviewed with patient who verbalized the following symptoms: no new symptoms and no worsening symptoms. Due to no new or worsening symptoms encounter was not routed to provider for escalation. Educated patient about risk for severe COVID-19 due to risk factors according to CDC guidelines. LPN CC reviewed discharge instructions, medical action plan and red flag symptoms with the patient who verbalized understanding. Discussed COVID vaccination status: Yes. Education provided on COVID-19 vaccination as appropriate. Discussed exposure protocols and quarantine with CDC Guidelines. Patient was given an opportunity to verbalize any questions and concerns and agrees to contact LPN CC or health care provider for questions related to their healthcare. Was patient discharged with a pulse oximeter? No Discussed and confirmed pulse oximeter discharge instructions and when to notify provider or seek emergency care. LPN CC provided contact information. Plan for follow-up call in 5-7 days based on severity of symptoms and risk factors. Patient reports he is pretty good. He still has some weakness and fatigue but it is better.

## 2022-02-01 ENCOUNTER — OFFICE VISIT (OUTPATIENT)
Dept: FAMILY MEDICINE CLINIC | Age: 77
End: 2022-02-01
Payer: MEDICARE

## 2022-02-01 VITALS
OXYGEN SATURATION: 98 % | BODY MASS INDEX: 24.96 KG/M2 | SYSTOLIC BLOOD PRESSURE: 118 MMHG | WEIGHT: 189.2 LBS | DIASTOLIC BLOOD PRESSURE: 62 MMHG | HEART RATE: 76 BPM

## 2022-02-01 DIAGNOSIS — R00.2 PALPITATIONS: ICD-10-CM

## 2022-02-01 DIAGNOSIS — R09.02 HYPOXEMIA: ICD-10-CM

## 2022-02-01 DIAGNOSIS — U07.1 COVID-19: Primary | ICD-10-CM

## 2022-02-01 DIAGNOSIS — G47.33 OSA ON CPAP: ICD-10-CM

## 2022-02-01 DIAGNOSIS — Z99.89 OSA ON CPAP: ICD-10-CM

## 2022-02-01 PROCEDURE — 99214 OFFICE O/P EST MOD 30 MIN: CPT | Performed by: FAMILY MEDICINE

## 2022-02-01 SDOH — ECONOMIC STABILITY: FOOD INSECURITY: WITHIN THE PAST 12 MONTHS, THE FOOD YOU BOUGHT JUST DIDN'T LAST AND YOU DIDN'T HAVE MONEY TO GET MORE.: NEVER TRUE

## 2022-02-01 SDOH — ECONOMIC STABILITY: FOOD INSECURITY: WITHIN THE PAST 12 MONTHS, YOU WORRIED THAT YOUR FOOD WOULD RUN OUT BEFORE YOU GOT MONEY TO BUY MORE.: NEVER TRUE

## 2022-02-01 ASSESSMENT — ENCOUNTER SYMPTOMS
SHORTNESS OF BREATH: 0
BLOOD IN STOOL: 0
RHINORRHEA: 0
SORE THROAT: 0
DIARRHEA: 0
VOMITING: 0
FACIAL SWELLING: 0
NAUSEA: 0
SINUS PRESSURE: 0
ANAL BLEEDING: 0
ABDOMINAL PAIN: 0

## 2022-02-01 ASSESSMENT — SOCIAL DETERMINANTS OF HEALTH (SDOH): HOW HARD IS IT FOR YOU TO PAY FOR THE VERY BASICS LIKE FOOD, HOUSING, MEDICAL CARE, AND HEATING?: NOT HARD AT ALL

## 2022-02-01 NOTE — PROGRESS NOTES
2022     Blanca Chadwick (:  1945) is a 78 y.o. male, here for evaluation of the following medical concerns:    HPI     Patient presented with his wife today  [id number]- patient is recovering from and was hospitalized    Hospital follow up- admitted on  and discharged on ,note stated the following:   Josef Catalan is a 69 y. o. male who presented to [facility] with 14 days of malaise/ generalised weakness and myalgias assoc with anorexia. In the 4 days prior he developed a cough with yellowish sputum and fevers assoc with anosmia but normal taste and without  chest pain, abdo pain/N/V/D or urinary symptoms. He had a Positive covid contact with wife who hd Covid. He also now tested positive. His wife was requesting Dr Mary Mondragon order monoclonal infusion for the patient- we told her monoclonal AB are for out patients at risk of deteriorating/ to help prevent hospitalisation. He is out of the antibody window. He was treated with steroids and discharged with home oxygen therapy, antibiotics to cover possible bacterial superinfection and decadron. Patient is weaning off of O2, feel much improved today. Today, he denied chest pain, n, v, or diarrhea.      Review of Systems   Constitutional: Positive for fatigue. Negative for activity change, fever and unexpected weight change. HENT: Negative for congestion, ear pain, facial swelling, rhinorrhea, sinus pressure and sore throat. Respiratory: Positive for cough, chest tightness and wheezing. Negative for shortness of breath. Cardiovascular: Negative for chest pain, palpitations and leg swelling. Gastrointestinal: Negative for abdominal pain, anal bleeding, blood in stool, diarrhea, nausea and vomiting. Endocrine: Negative for cold intolerance, heat intolerance, polydipsia and polyphagia. Genitourinary: Negative for urgency. Musculoskeletal: Positive for arthralgias. Skin: Negative for rash.    Neurological: Negative for dizziness, syncope, light-headedness and headaches. Prior to Visit Medications    Medication Sig Taking? Authorizing Provider   metoprolol tartrate (LOPRESSOR) 50 MG tablet TAKE ONE TABLET BY MOUTH DAILY Yes Arjun Delgadillo DO   aspirin 81 MG chewable tablet Take 1 tablet by mouth daily Yes Alyssa Steen MD        Social History     Tobacco Use    Smoking status: Never Smoker    Smokeless tobacco: Never Used   Substance Use Topics    Alcohol use: Yes     Alcohol/week: 7.0 standard drinks     Types: 7 Cans of beer per week        Vitals:    02/01/22 1142   BP: 118/62   Pulse: 76   SpO2: 98%   Weight: 189 lb 3.2 oz (85.8 kg)     Estimated body mass index is 24.96 kg/m² as calculated from the following:    Height as of 1/15/22: 6' 1\" (1.854 m). Weight as of this encounter: 189 lb 3.2 oz (85.8 kg). Physical Exam  Vitals and nursing note reviewed. Constitutional:       Appearance: He is well-developed. HENT:      Head: Normocephalic and atraumatic. Right Ear: Tympanic membrane, ear canal and external ear normal.      Left Ear: Tympanic membrane, ear canal and external ear normal.   Eyes:      Pupils: Pupils are equal, round, and reactive to light. Neck:      Thyroid: No thyromegaly. Cardiovascular:      Rate and Rhythm: Normal rate and regular rhythm. Heart sounds: No murmur heard. Pulmonary:      Effort: Pulmonary effort is normal.      Breath sounds: Wheezing present. No rales. Abdominal:      General: Bowel sounds are normal.      Palpations: Abdomen is soft. Tenderness: There is no abdominal tenderness. Musculoskeletal:         General: Tenderness present. Cervical back: Neck supple. Lymphadenopathy:      Cervical: No cervical adenopathy. Skin:     Findings: No rash. Neurological:      Mental Status: He is alert and oriented to person, place, and time.    Psychiatric:         Behavior: Behavior normal.         Judgment: Judgment normal. ASSESSMENT/PLAN:  1. COVID-19  Take medication as prescribed. Push fluids and rest  Discussed conservative treatment  Discussed signs and symptoms for immediate evaluation in the ER  RTC if no improved. Tylenol/Ibuprofen for pain  - XR CHEST STANDARD (2 VW); Future    2. Hypoxemia  Referred for procedure  Educated on the importance of having this done. Answered questions  - XR CHEST STANDARD (2 VW); Future    3. Palpitations  Stable  Continue with medication  Keep appointments with specialist.   Answered questions  - ECHO Complete 2D W Doppler W Color; Future    4. PAULINA on CPAP  Stable  Continue with medication  Keep appointments with specialist.   Omero Tatum questions      Return in about 3 months (around 5/1/2022).

## 2022-02-04 ENCOUNTER — CARE COORDINATION (OUTPATIENT)
Dept: CASE MANAGEMENT | Age: 77
End: 2022-02-04

## 2022-02-09 ENCOUNTER — TELEPHONE (OUTPATIENT)
Dept: FAMILY MEDICINE CLINIC | Age: 77
End: 2022-02-09

## 2022-02-09 NOTE — TELEPHONE ENCOUNTER
----- Message from Joshua Chamberlain sent at 2/9/2022  1:45 PM EST -----  Subject: Message to Provider    QUESTIONS  Information for Provider? Patient calling to inform that he will be going   to the lab to get his Echo and x-ray done next week   ---------------------------------------------------------------------------  --------------  CALL BACK INFO  What is the best way for the office to contact you? OK to leave message on   voicemail  Preferred Call Back Phone Number? [phone]  ---------------------------------------------------------------------------  --------------  SCRIPT ANSWERS  Relationship to Patient?  Self

## 2022-02-11 ENCOUNTER — CARE COORDINATION (OUTPATIENT)
Dept: CASE MANAGEMENT | Age: 77
End: 2022-02-11

## 2022-02-11 NOTE — CARE COORDINATION
Toby [facility] Transitions Follow Up Call    2022    Patient: Ghislaine Morrow  Patient : 1945   MRN: <[id number]>  Reason for Admission: PNA d/t COVID  Discharge Date: 22 RARS: Readmission Risk Score: 4 ( )         Spoke with: Tamara Drain (wife) patient in the background driving. She states that patient is doing great. She reports that he is still on O2 .5 liters only at bedtime and he goes to get another chest x ray on . She denies any issue with appetite, fluid intake, bowel or bladder. Denies any needs at this time. Agrees to follow up calls. Care Transitions Follow Up Call    Needs to be reviewed by the provider   Additional needs identified to be addressed with provider: No  none             Method of communication with provider : phone      Care Transition Nurse (CTN) contacted the family by telephone to follow up after admission. Verified name and  with family as identifiers. Addressed changes since last contact: none  Discussed follow-up appointments. If no appointment was previously scheduled, appointment scheduling offered: Yes and HFU was on 2022. Is follow up appointment scheduled within 7 days of discharge? No.      Advance Care Planning   Healthcare Decision Maker:    Primary Decision Maker: Caesar Jenkins - [phone]    CTN reviewed discharge instructions, medical action plan and red flags with family and discussed any barriers to care and/or understanding of plan of care after discharge. Discussed appropriate site of care based on symptoms and resources available to patient including: PCP, Specialist and When to call 911. The family agrees to contact the PCP office for questions related to their healthcare. Non-[facility] follow up appointment(s): na    CTN provided contact information for future needs. Plan for follow-up call in 7-10 days based on severity of symptoms and risk factors.   Plan for next call: symptom management-.         Care Transitions Subsequent and Final Call    Subsequent and Final Calls  Do you have any ongoing symptoms?: No  Have your medications changed?: No  Do you have any questions related to your medications?: No  Do you currently have any active services?: No  Are you currently active with any services?: Outpatient/Community Services  Do you have any needs or concerns that I can assist you with?: No  Identified Barriers: None  Care Transitions Interventions  No Identified Needs  Other Interventions:            Follow Up  Future Appointments   Date Time Provider Es Cain   2/14/2022  9:00 AM Emily Mayen MD [facility]   3/1/2022 11:15 AM DO Abbey Barreto  Amber Rivera LPN

## 2022-02-14 ENCOUNTER — OFFICE VISIT (OUTPATIENT)
Dept: SLEEP MEDICINE | Age: 77
End: 2022-02-14
Payer: MEDICARE

## 2022-02-14 ENCOUNTER — HOSPITAL ENCOUNTER (OUTPATIENT)
Dept: GENERAL RADIOLOGY | Age: 77
Discharge: HOME | End: 2022-02-14
Payer: MEDICARE

## 2022-02-14 ENCOUNTER — HOSPITAL ENCOUNTER (OUTPATIENT)
Age: 77
Discharge: HOME | End: 2022-02-14
Payer: MEDICARE

## 2022-02-14 VITALS
HEART RATE: 100 BPM | OXYGEN SATURATION: 95 % | SYSTOLIC BLOOD PRESSURE: 120 MMHG | TEMPERATURE: 97.3 F | RESPIRATION RATE: 18 BRPM | BODY MASS INDEX: 25.95 KG/M2 | WEIGHT: 195.8 LBS | DIASTOLIC BLOOD PRESSURE: 70 MMHG | HEIGHT: 73 IN

## 2022-02-14 DIAGNOSIS — Z99.89 DEPENDENCE ON OTHER ENABLING MACHINES AND DEVICES: ICD-10-CM

## 2022-02-14 DIAGNOSIS — R09.02 HYPOXEMIA: ICD-10-CM

## 2022-02-14 DIAGNOSIS — Z99.89 OSA ON CPAP: Primary | ICD-10-CM

## 2022-02-14 DIAGNOSIS — Z99.81 OXYGEN DEPENDENT: ICD-10-CM

## 2022-02-14 DIAGNOSIS — G47.33 OSA ON CPAP: Primary | ICD-10-CM

## 2022-02-14 DIAGNOSIS — U07.1 COVID-19: ICD-10-CM

## 2022-02-14 PROCEDURE — 71046 X-RAY EXAM CHEST 2 VIEWS: CPT

## 2022-02-14 PROCEDURE — 99214 OFFICE O/P EST MOD 30 MIN: CPT | Performed by: PSYCHIATRY & NEUROLOGY

## 2022-02-14 ASSESSMENT — SLEEP AND FATIGUE QUESTIONNAIRES
HOW LIKELY ARE YOU TO NOD OFF OR FALL ASLEEP WHILE SITTING INACTIVE IN A PUBLIC PLACE: 1
HOW LIKELY ARE YOU TO NOD OFF OR FALL ASLEEP WHILE SITTING AND TALKING TO SOMEONE: 0
HOW LIKELY ARE YOU TO NOD OFF OR FALL ASLEEP WHILE SITTING QUIETLY AFTER LUNCH WITHOUT ALCOHOL: 0
HOW LIKELY ARE YOU TO NOD OFF OR FALL ASLEEP WHILE LYING DOWN TO REST IN THE AFTERNOON WHEN CIRCUMSTANCES PERMIT: 0
HOW LIKELY ARE YOU TO NOD OFF OR FALL ASLEEP IN A CAR, WHILE STOPPED FOR A FEW MINUTES IN TRAFFIC: 0
HOW LIKELY ARE YOU TO NOD OFF OR FALL ASLEEP WHILE WATCHING TV: 1
HOW LIKELY ARE YOU TO NOD OFF OR FALL ASLEEP WHEN YOU ARE A PASSENGER IN A CAR FOR AN HOUR WITHOUT A BREAK: 0
HOW LIKELY ARE YOU TO NOD OFF OR FALL ASLEEP WHILE SITTING AND READING: 1
ESS TOTAL SCORE: 3

## 2022-02-14 NOTE — PROGRESS NOTES
MD LACEY Marx Board Certified in Sleep Medicine  Certified in [address] [place] [address] Certified in Neurology Diego Chloe Mendez [address] [place] [address],  Anthony Sherwood 69  [phone]   [address] [place] [address], [address] [place] [address]                      [address]  [address] [address] [phone]    Subjective:     Patient ID: Blanca Chadwick is a 78 y.o. male. Chief Complaint   Patient presents with    Follow-up    Sleep Apnea       HPI:        Blanca Chadwick is a 78 y.o. male was seen today as a follow for obstructive sleep apnea. The patient underwent home sleep testing on severe, the overnight registration revealed severe obstructive sleep apnea with apnea hypopnea index of 36.5/hr with lowest O2 saturation of 70%, patient spent about 66.4 minutes below 90%. Patient is using the PAP machine about 100% of the time, more than 4 hours a nightabout  100 %, in total average of 7:55 hours a night in last 44 days. Currently on PAP at 10.6 cm (6-16), the AHI is only 1.8 events per hour at this pressure. Patient improved regarding daytime sleepiness and fatigue, wakes up refreshed in the morning. The Patient scored Total score: 3 on Wellford Sleepiness Scale ( more than 10 is indicative of daytime sleepiness)   Patient has no problem with PAP pressure or mask, uses nasal mask. Wakes up in the morning with dry mouth, the setting of the heated humidifier at # auto.   Still on O2 at 1/2 LPM after he had COVID 01/2022  DOT/CDL - N/A        Previous Report(s)Reviewed: historical medical records         Social History     Socioeconomic History    Marital status:      Spouse name: Not on file    Number of children: Not on file    Years of education: Not on file    Highest education level: Not on file Occupational History    Not on file   Tobacco Use    Smoking status: Never Smoker    Smokeless tobacco: Never Used   Substance and Sexual Activity    Alcohol use: Yes     Alcohol/week: 7.0 standard drinks     Types: 7 Cans of beer per week    Drug use: No    Sexual activity: Yes     Partners: Female   Other Topics Concern    Not on file   Social History Narrative    Not on file     Social Determinants of Health     Financial Resource Strain: Low Risk     Difficulty of Paying Living Expenses: Not hard at all   Food Insecurity: No Food Insecurity    Worried About [facility] in the Last Year: Never true    [facility] in the Last Year: Never true   Transportation Needs:     Lack of Transportation (Medical): Not on file    Lack of Transportation (Non-Medical): Not on file   Physical Activity:     Days of Exercise per Week: Not on file    Minutes of Exercise per Session: Not on file   Stress:     Feeling of Stress : Not on file   Social Connections:     Frequency of Communication with Friends and Family: Not on file    Frequency of Social Gatherings with Friends and Family: Not on file    Attends Yazdanism Services: Not on file    Active Member of [address] [place] [address] or Organizations: Not on file    Attends Club or Organization Meetings: Not on file    Marital Status: Not on file   Intimate Partner Violence:     Fear of Current or Ex-Partner: Not on file    Emotionally Abused: Not on file    Physically Abused: Not on file    Sexually Abused: Not on file   Housing Stability:     Unable to Pay for Housing in the Last Year: Not on file    Number of [place] in the Last Year: Not on file    Unstable Housing in the Last Year: Not on file       Prior to Admission medications    Medication Sig Start Date End Date Taking?  Authorizing Provider   metoprolol tartrate (LOPRESSOR) 50 MG tablet TAKE ONE TABLET BY MOUTH DAILY 1/19/22  Yes Tiff Velez DO   aspirin 81 MG chewable tablet Take 1 tablet by mouth daily 1/18/22  Yes Willi Perez MD       Allergies as of 02/14/2022    (No Known Allergies)       Patient Active Problem List   Diagnosis    Palpitations    Hyperlipidemia    PAULINA (obstructive sleep apnea)    Pneumonia due to COVID-19 virus    Hypoxemia    COVID-19    PAULINA on CPAP       Past Medical History:   Diagnosis Date    Arrhythmia     Cataracts, bilateral     Sleep apnea        History reviewed. No pertinent surgical history. History reviewed. No pertinent family history. Review of Systems    Objective:     Vitals:  Weight BMI Neck circumference    Wt Readings from Last 3 Encounters:   02/14/22 195 lb 12.8 oz (88.8 kg)   02/01/22 189 lb 3.2 oz (85.8 kg)   01/17/22 192 lb 0.3 oz (87.1 kg)    Body mass index is 25.83 kg/m². BP HR SaO2   BP Readings from Last 3 Encounters:   02/14/22 120/70   02/01/22 118/62   01/17/22 138/74    Pulse Readings from Last 3 Encounters:   02/14/22 100   02/01/22 76   01/17/22 66    SpO2 Readings from Last 3 Encounters:   02/14/22 95%   02/01/22 98%   01/17/22 94%        Themandibular molar Class :   []1 [x]2 []3 Mild      Mallampati I Airway Classification:   []1 []2 [x]3 []4      Physical Exam  Vitals and nursing note reviewed. Constitutional:       Appearance: Normal appearance. HENT:      Head: Atraumatic. Mouth/Throat:      Mouth: Mucous membranes are moist.   Eyes:      Extraocular Movements: Extraocular movements intact. Cardiovascular:      Rate and Rhythm: Regular rhythm. Pulmonary:      Breath sounds: Normal breath sounds. Musculoskeletal:         General: Normal range of motion. Cervical back: Normal range of motion. Skin:     General: Skin is warm. Neurological:      Mental Status: Mental status is at baseline. Psychiatric:         Mood and Affect: Mood normal.         :   Severe Obstructive Sleep Apnea/Hypopnea Syndrome under good control on PAP at 10.6 cmwp. Diagnosis Orders   1. PAULINA on CPAP     2. Dependence on other enabling machines and devices     3. Oxygen dependent       Plan: Will continue the PAP at 6-16 cmwp. I educated the Patient about the PAP machine including how to change the heated humidifier. I will order PAP supplies, mask, filters. ... He said most likely he will D/C the O2 in the next 2 weeks. No orders of the defined types were placed in this encounter. Return for Reveiwing CPAP usage and compliance report and tro.     Ming Naranjo MD  Medical Director - [facility]

## 2022-02-14 NOTE — PATIENT INSTRUCTIONS
Patient Education        Learning About CPAP for Sleep Apnea  What is CPAP? CPAP is a small machine that you use at home every night while you sleep. It increases air pressure in your throat to keep your airway open. When you have sleep apnea, this can help you sleep better, feel better, and avoid future health problems. CPAP stands for \"continuous positive airway pressure. \"  The CPAP machine will have one of the following:  · A mask that covers your nose and mouth  · A mask that covers your nose only  · A nasal pillow that covers only the openings of your nose  Why is it done? CPAP is usually the best treatment for obstructive sleep apnea. It is the first treatment choice and the most widely used. CPAP:  · Helps you have more normal sleep, so you feel less sleepy and more alert during the daytime. · May help keep heart failure or other heart problems from getting worse. · May help lower your blood pressure. If you have a bed partner, they may also sleep better when you use a CPAP. That's because you aren't snoring or restless. Your doctor may suggest CPAP if you have:  · Moderate to severe sleep apnea. · Sleep apnea and coronary artery disease (CAD). · Sleep apnea and heart failure. What are the side effects? Some people who use CPAP have:  · A dry or stuffy nose and a sore throat. · Irritated skin on the face. · Bloating. How can you care for yourself? If using CPAP is not comfortable, or if you have certain side effects, work with your doctor to fix them. Here are some things you can try:  · Be sure the mask, nasal mask, or nasal pillow fits well. · See if your doctor can adjust the pressure of your CPAP. · If your nose or mouth is dry, set the machine to deliver warmer or wetter air. Or try using a humidifier. · If your nose is runny or stuffy, talk to your doctor about using a decongestant medicine or steroid nasal spray. Be safe with medicines.  Read and follow all instructions on the label. Do not use the medicine longer than the label says. · Your doctor may also help you with problems like swallowing air, bloating, or claustrophobia. Talk to your doctor if you're still having problems. If these things don't help, you might try a different type of machine. Where can you learn more? Go to https://[email or web address].[email or web address]. org and sign in to your [employer] account. Enter E966 in the [employer] box to learn more about \"Learning About CPAP for Sleep Apnea. \"     If you do not have an account, please click on the \"Sign Up Now\" link. Current as of: July 6, 2021               Content Version: 13.1  © 2006-2021 Healthwise, Incorporated. Care instructions adapted under license by [facility] ([facility]). If you have questions about a medical condition or this instruction, always ask your healthcare professional. [address] any warranty or liability for your use of this information.

## 2022-02-16 ENCOUNTER — TELEMEDICINE (OUTPATIENT)
Dept: FAMILY MEDICINE CLINIC | Age: 77
End: 2022-02-16
Payer: MEDICARE

## 2022-02-16 DIAGNOSIS — Z00.00 INITIAL MEDICARE ANNUAL WELLNESS VISIT: Primary | ICD-10-CM

## 2022-02-16 DIAGNOSIS — R00.2 PALPITATIONS: ICD-10-CM

## 2022-02-16 PROCEDURE — G0438 PPPS, INITIAL VISIT: HCPCS | Performed by: FAMILY MEDICINE

## 2022-02-16 RX ORDER — METOPROLOL TARTRATE 50 MG/1
TABLET, FILM COATED ORAL
Qty: 90 TABLET | Refills: 1 | Status: SHIPPED | OUTPATIENT
Start: 2022-02-16

## 2022-02-16 ASSESSMENT — ENCOUNTER SYMPTOMS
COUGH: 1
WHEEZING: 1
CHEST TIGHTNESS: 1

## 2022-02-16 ASSESSMENT — PATIENT HEALTH QUESTIONNAIRE - PHQ9
SUM OF ALL RESPONSES TO PHQ QUESTIONS 1-9: 2
2. FEELING DOWN, DEPRESSED OR HOPELESS: 1
SUM OF ALL RESPONSES TO PHQ9 QUESTIONS 1 & 2: 2
SUM OF ALL RESPONSES TO PHQ QUESTIONS 1-9: 2
1. LITTLE INTEREST OR PLEASURE IN DOING THINGS: 1

## 2022-02-16 NOTE — PROGRESS NOTES
Medicare Annual Wellness Visit    Julienne Horowitz is here for Medicare AWV and Other (Xray Results)    [address] was seen today for medicare awv and other. Diagnoses and all orders for this visit:    Palpitations         Recommendations for Preventive Services Due: see orders and patient instructions/AVS.  Recommended screening schedule for the next 5-10 years is provided to the patient in written form: see Patient Instructions/AVS.     No follow-ups on file. Reviewed and updated this visit by clinical staff:  Meds       Subjective   The following acute and/or chronic problems were also addressed today:  Covid 19    Patient's complete Health Risk Assessment and screening values have been reviewed and are found in Flowsheets. The following problems were reviewed today and where indicated follow up appointments were made and/or referrals ordered. Positive Risk Factor Screenings with Interventions:     Cognitive:   Words recalled: 2 Words Recalled  Total Score Interpretation: Abnormal Mini-Cog    Cognitive Impairment Interventions:  · Patient declines any further evaluation/treatment for cognitive impairment           General Health and ACP:  General  In general, how would you say your health is?: Very Good  In the past 7 days, have you experienced any of the following: New or Increased Pain, New or Increased Fatigue, Loneliness, Social Isolation, Stress or Anger?: No  Do you get the social and emotional support that you need?: Yes  Do you have a Living Will?: Yes    Advance Directives     Power of  Living Will ACP-Advance Directive ACP-Power of     Not on File Not on File Not on File Not on File      General Health Risk Interventions:  · has living will     Hearing/Vision:  No exam data present  Hearing/Vision  Do you or your family notice any trouble with your hearing that hasn't been managed with hearing aids?: No  Do you have difficulty driving, watching TV, or doing any of your daily activities because of your eyesight?: No  Have you had an eye exam within the past year?: (!) No    Hearing/Vision Interventions:  · no problems         Objective      Patient-Reported Vitals  No data recorded     No PE      No Known Allergies  Prior to Visit Medications    Medication Sig Taking? Authorizing Provider   metoprolol tartrate (LOPRESSOR) 50 MG tablet TAKE ONE TABLET BY MOUTH DAILY Yes Charlestine Phoenix, DO   aspirin 81 MG chewable tablet Take 1 tablet by mouth daily Yes Yayo Del Rio MD       CareTeam (Including outside providers/suppliers regularly involved in providing care):   Patient Care Team:  Charlestine Phoenix, DO as PCP - General (Family Medicine)  Charlestine Phoenix, DO as [facility]  Savannah Blanton RN as Care Transitions Nurse       Jc Kurtz, was evaluated through a synchronous (real-time) audio-video encounter. The patient (or guardian if applicable) is aware that this is a billable service, which includes applicable co-pays. This Virtual Visit was conducted with patient's (and/or legal guardian's) consent. The visit was conducted pursuant to the emergency declaration under the [address] [place] [address], [address] [place] [address] authority and the [employer] and [employer] General Act. Patient identification was verified, and a caregiver was present when appropriate. The patient was located at home in a state where the provider was licensed to provide care. Medicare Annual Wellness Visit    Jc Kurtz is here for Medicare AWV and Other (Xray Results)    [address] was seen today for medicare awv and other.     Diagnoses and all orders for this visit:    Initial Medicare annual wellness visit    Palpitations  -     metoprolol tartrate (LOPRESSOR) 50 MG tablet; TAKE ONE TABLET BY MOUTH DAILY         Recommendations for Preventive Services Due: see orders and patient to Visit Medications    Medication Sig Taking? Authorizing Provider   metoprolol tartrate (LOPRESSOR) 50 MG tablet TAKE ONE TABLET BY MOUTH DAILY Yes Mejia Cox DO   aspirin 81 MG chewable tablet Take 1 tablet by mouth daily Yes Marco Colindres MD       [facility] (Including outside providers/suppliers regularly involved in providing care):   Patient Care Team:  Mejia Cox DO as PCP - General (Family Medicine)  Mejia Cox DO as PCP - [facility]  Mariama Gomez RN as Care Transitions Nurse       Shannen Elizabeth, was evaluated through a synchronous (real-time) audio-video encounter. The patient (or guardian if applicable) is aware that this is a billable service, which includes applicable co-pays. This Virtual Visit was conducted with patient's (and/or legal guardian's) consent. The visit was conducted pursuant to the emergency declaration under the [facility], [address] [place] [address] authority and the [employer] and [employer] General Act. Patient identification was verified, and a caregiver was present when appropriate. The patient was located at home in a state where the provider was licensed to provide care.

## 2022-02-16 NOTE — PATIENT INSTRUCTIONS
Personalized Preventive Plan for Augusto Eastman - 2/16/2022  Medicare offers a range of preventive health benefits. Some of the tests and screenings are paid in full while other may be subject to a deductible, co-insurance, and/or copay. Some of these benefits include a comprehensive review of your medical history including lifestyle, illnesses that may run in your family, and various assessments and screenings as appropriate. After reviewing your medical record and screening and assessments performed today your provider may have ordered immunizations, labs, imaging, and/or referrals for you. A list of these orders (if applicable) as well as your Preventive Care list are included within your After Visit Summary for your review. Other Preventive Recommendations:    · A preventive eye exam performed by an eye specialist is recommended every 1-2 years to screen for glaucoma; cataracts, macular degeneration, and other eye disorders. · A preventive dental visit is recommended every 6 months. · Try to get at least 150 minutes of exercise per week or 10,000 steps per day on a pedometer . · Order or download the FREE \"Exercise & Physical Activity: Your Everyday Guide\" from [employer]. Call 5-627.168.4434 or search [employer] online. · You need 3501-2966 mg of calcium and 3573-8234 IU of vitamin D per day. It is possible to meet your calcium requirement with diet alone, but a vitamin D supplement is usually necessary to meet this goal.  · When exposed to the sun, use a sunscreen that protects against both UVA and UVB radiation with an SPF of 30 or greater. Reapply every 2 to 3 hours or after sweating, drying off with a towel, or swimming. · Always wear a seat belt when traveling in a car. Always wear a helmet when riding a bicycle or motorcycle.

## 2022-03-01 ENCOUNTER — OFFICE VISIT (OUTPATIENT)
Dept: FAMILY MEDICINE CLINIC | Age: 77
End: 2022-03-01
Payer: MEDICARE

## 2022-03-01 VITALS
OXYGEN SATURATION: 98 % | SYSTOLIC BLOOD PRESSURE: 122 MMHG | DIASTOLIC BLOOD PRESSURE: 66 MMHG | HEART RATE: 67 BPM | BODY MASS INDEX: 25.78 KG/M2 | WEIGHT: 195.4 LBS

## 2022-03-01 DIAGNOSIS — Z99.89 OSA ON CPAP: ICD-10-CM

## 2022-03-01 DIAGNOSIS — J12.82 PNEUMONIA DUE TO COVID-19 VIRUS: ICD-10-CM

## 2022-03-01 DIAGNOSIS — L98.9 SKIN LESIONS: Primary | ICD-10-CM

## 2022-03-01 DIAGNOSIS — G47.33 OSA ON CPAP: ICD-10-CM

## 2022-03-01 DIAGNOSIS — R00.2 PALPITATIONS: ICD-10-CM

## 2022-03-01 DIAGNOSIS — U07.1 PNEUMONIA DUE TO COVID-19 VIRUS: ICD-10-CM

## 2022-03-01 PROCEDURE — 99214 OFFICE O/P EST MOD 30 MIN: CPT | Performed by: FAMILY MEDICINE

## 2022-03-01 NOTE — PROGRESS NOTES
3/1/2022     Gisselle Sommers (:  1945) is a 78 y.o. male, here for evaluation of the following medical concerns:    HPI  Patient presented for follow up in the office. [id number]- patient is recovering from and was hospitalized     Hospital follow up- admitted on  and discharged on ,note stated the following:   Geneva Michael is a 69 y. o. male who presented to [facility] with 14 days of malaise/ generalised weakness and myalgias assoc with anorexia. In the 4 days prior he developed a cough with yellowish sputum and fevers assoc with anosmia but normal taste and without  chest pain, abdo pain/N/V/D or urinary symptoms. He had a Positive covid contact with wife who hd Covid. He also now tested positive. His wife was requesting Dr Susan Hermosillo order monoclonal infusion for the patient- we told her monoclonal AB are for out patients at risk of deteriorating/ to help prevent hospitalisation. He is out of the antibody window. He was treated with steroids and discharged with home oxygen therapy, antibiotics to cover possible bacterial superinfection and decadron.     Patient is weaning off of O2, feel much improved today but still on O2 at night. PAULINA- on CPAP    Palpitations patient is having these more often, has had these for years, ordered echo, on Lopressor.      Today, he denied chest pain, n, v, or diarrhea. Review of Systems   Constitutional: Positive for fatigue. Negative for activity change, fever and unexpected weight change. HENT: Negative for congestion, ear pain, rhinorrhea, sinus pressure and sore throat. Respiratory: Positive for cough and shortness of breath. Cardiovascular: Positive for palpitations. Negative for chest pain and leg swelling. Gastrointestinal: Negative for abdominal pain, diarrhea, nausea and vomiting. Musculoskeletal: Negative for arthralgias. Skin: Negative for rash. Neurological: Negative for dizziness, syncope, light-headedness and headaches. Psychiatric/Behavioral: Negative for dysphoric mood. The patient is not nervous/anxious. Prior to Visit Medications    Medication Sig Taking? Authorizing Provider   metoprolol tartrate (LOPRESSOR) 50 MG tablet TAKE ONE TABLET BY MOUTH DAILY Yes Juliocesar Hoover DO   aspirin 81 MG chewable tablet Take 1 tablet by mouth daily Yes Jon Bryant MD        Social History     Tobacco Use    Smoking status: Never Smoker    Smokeless tobacco: Never Used   Substance Use Topics    Alcohol use: Yes     Alcohol/week: 7.0 standard drinks     Types: 7 Cans of beer per week        Vitals:    03/01/22 1109   BP: 122/66   Pulse: 67   SpO2: 98%   Weight: 195 lb 6.4 oz (88.6 kg)     Estimated body mass index is 25.78 kg/m² as calculated from the following:    Height as of 2/14/22: 6' 1\" (1.854 m). Weight as of this encounter: 195 lb 6.4 oz (88.6 kg). Physical Exam  Vitals and nursing note reviewed. Constitutional:       Appearance: He is well-developed. HENT:      Head: Normocephalic and atraumatic. Right Ear: Tympanic membrane, ear canal and external ear normal.      Left Ear: Tympanic membrane, ear canal and external ear normal.   Eyes:      Pupils: Pupils are equal, round, and reactive to light. Neck:      Thyroid: No thyromegaly. Cardiovascular:      Rate and Rhythm: Normal rate and regular rhythm. Heart sounds: No murmur heard. Pulmonary:      Effort: Pulmonary effort is normal.      Breath sounds: Normal breath sounds. No wheezing or rales. Abdominal:      General: Bowel sounds are normal.      Palpations: Abdomen is soft. Tenderness: There is no abdominal tenderness. Musculoskeletal:         General: Normal range of motion. Skin:     Findings: No rash. Neurological:      Mental Status: He is alert and oriented to person, place, and time. Psychiatric:         Behavior: Behavior normal.         Judgment: Judgment normal.         ASSESSMENT/PLAN:  1.  Skin lesions  Stable  Continue with medication  Keep appointments with specialist.   Answered questions  - External Referral To Dermatology    2. Pneumonia due to COVID-19 virus  Stable  Continue with medication  Keep appointments with specialist.   Napoleon Mallet questions    3. PAULINA on CPAP  Stable  Continue with CPAP  Keep appointments with specialist.   Napoleon Mallsarah questions    4. Palpitations  Echo ordered  Continue with medications      Return in about 4 weeks (around 3/29/2022).

## 2022-03-14 ASSESSMENT — ENCOUNTER SYMPTOMS
SINUS PRESSURE: 0
SHORTNESS OF BREATH: 1
COUGH: 1
RHINORRHEA: 0
DIARRHEA: 0
VOMITING: 0
NAUSEA: 0
ABDOMINAL PAIN: 0
SORE THROAT: 0

## 2022-03-23 ENCOUNTER — HOSPITAL ENCOUNTER (OUTPATIENT)
Dept: NON INVASIVE DIAGNOSTICS | Age: 77
Discharge: HOME | End: 2022-03-23
Payer: MEDICARE

## 2022-03-23 DIAGNOSIS — R00.2 PALPITATIONS: ICD-10-CM

## 2022-03-23 LAB
LV EF: 55 %
LVEF MODALITY: NORMAL

## 2022-03-23 PROCEDURE — 93306 TTE W/DOPPLER COMPLETE: CPT

## 2022-06-01 ENCOUNTER — OFFICE VISIT (OUTPATIENT)
Dept: FAMILY MEDICINE CLINIC | Age: 77
End: 2022-06-01
Payer: MEDICARE

## 2022-06-01 VITALS
SYSTOLIC BLOOD PRESSURE: 120 MMHG | OXYGEN SATURATION: 98 % | DIASTOLIC BLOOD PRESSURE: 64 MMHG | WEIGHT: 200 LBS | HEART RATE: 57 BPM | BODY MASS INDEX: 26.39 KG/M2

## 2022-06-01 DIAGNOSIS — R00.2 PALPITATIONS: Primary | ICD-10-CM

## 2022-06-01 DIAGNOSIS — G47.33 OSA (OBSTRUCTIVE SLEEP APNEA): ICD-10-CM

## 2022-06-01 DIAGNOSIS — D72.819 LEUKOPENIA, UNSPECIFIED TYPE: ICD-10-CM

## 2022-06-01 DIAGNOSIS — E78.2 MIXED HYPERLIPIDEMIA: ICD-10-CM

## 2022-06-01 LAB
A/G RATIO: 2 (ref 1.1–2.2)
ALBUMIN SERPL-MCNC: 4.6 G/DL (ref 3.4–5)
ALP BLD-CCNC: 59 U/L (ref 40–129)
ALT SERPL-CCNC: 12 U/L (ref 10–40)
ANION GAP SERPL CALCULATED.3IONS-SCNC: 13 MMOL/L (ref 3–16)
AST SERPL-CCNC: 17 U/L (ref 15–37)
BILIRUB SERPL-MCNC: 0.7 MG/DL (ref 0–1)
BUN BLDV-MCNC: 18 MG/DL (ref 7–20)
CALCIUM SERPL-MCNC: 9.2 MG/DL (ref 8.3–10.6)
CHLORIDE BLD-SCNC: 101 MMOL/L (ref 99–110)
CO2: 25 MMOL/L (ref 21–32)
CREAT SERPL-MCNC: 1.3 MG/DL (ref 0.8–1.3)
GFR AFRICAN AMERICAN: >60
GFR NON-AFRICAN AMERICAN: 54
GLUCOSE BLD-MCNC: 96 MG/DL (ref 70–99)
HCT VFR BLD CALC: 46.7 % (ref 40.5–52.5)
HEMOGLOBIN: 15.7 G/DL (ref 13.5–17.5)
MCH RBC QN AUTO: 29.5 PG (ref 26–34)
MCHC RBC AUTO-ENTMCNC: 33.7 G/DL (ref 31–36)
MCV RBC AUTO: 87.5 FL (ref 80–100)
PDW BLD-RTO: 14.1 % (ref 12.4–15.4)
PLATELET # BLD: 158 K/UL (ref 135–450)
PMV BLD AUTO: 7.2 FL (ref 5–10.5)
POTASSIUM SERPL-SCNC: 4.6 MMOL/L (ref 3.5–5.1)
RBC # BLD: 5.34 M/UL (ref 4.2–5.9)
SODIUM BLD-SCNC: 139 MMOL/L (ref 136–145)
TOTAL PROTEIN: 6.9 G/DL (ref 6.4–8.2)
WBC # BLD: 4.7 K/UL (ref 4–11)

## 2022-06-01 PROCEDURE — 99214 OFFICE O/P EST MOD 30 MIN: CPT | Performed by: FAMILY MEDICINE

## 2022-06-01 PROCEDURE — 36415 COLL VENOUS BLD VENIPUNCTURE: CPT | Performed by: FAMILY MEDICINE

## 2022-06-01 PROCEDURE — 1123F ACP DISCUSS/DSCN MKR DOCD: CPT | Performed by: FAMILY MEDICINE

## 2022-06-01 ASSESSMENT — ENCOUNTER SYMPTOMS
NAUSEA: 0
SINUS PRESSURE: 0
ABDOMINAL PAIN: 0
SHORTNESS OF BREATH: 0
BLOOD IN STOOL: 0
TROUBLE SWALLOWING: 0
ANAL BLEEDING: 0
RHINORRHEA: 0
VOMITING: 0
COUGH: 0
DIARRHEA: 0
SORE THROAT: 0

## 2022-06-01 NOTE — PROGRESS NOTES
2022     Yossi Fay (:  1945) is a 78 y.o. male, here for evaluation of the following medical concerns:    HPI      Patient is following up on several chronic medical concerns. Overall he feels well. PAULINA- using cpap, feels much improved    Palpitations- patient has been on medication for years,has \"skipped beats\", does not have dyspnea or fatigue with no chest pain at this times, stable today, taking Lopressor once a day instead of BID,  stated had similar symptoms years ago and was started on the medication, stated BID makes him too tired, will try to get him to take dose in evening.      Hyperlipidemia- patient has hx of elevated cholesterol        Today, chest pain, sob,n , v, or diarrhea. Review of Systems   Constitutional: Negative for activity change, fatigue, fever and unexpected weight change. HENT: Negative for congestion, ear pain, rhinorrhea, sinus pressure, sore throat and trouble swallowing. Respiratory: Negative for cough and shortness of breath. Cardiovascular: Positive for palpitations. Negative for chest pain and leg swelling. Gastrointestinal: Negative for abdominal pain, anal bleeding, blood in stool, diarrhea, nausea and vomiting. Endocrine: Negative for cold intolerance, heat intolerance, polydipsia and polyphagia. Genitourinary: Negative for decreased urine volume. Musculoskeletal: Negative for arthralgias. Skin: Negative for rash. Neurological: Negative for dizziness, light-headedness and headaches. Psychiatric/Behavioral: Negative for dysphoric mood. The patient is not nervous/anxious. Prior to Visit Medications    Medication Sig Taking?  Authorizing Provider   metoprolol tartrate (LOPRESSOR) 25 MG tablet Take 1 tablet by mouth daily Yes Diego Keys, DO   metoprolol tartrate (LOPRESSOR) 50 MG tablet TAKE ONE TABLET BY MOUTH DAILY Yes Chiara Keys, DO   aspirin 81 MG chewable tablet Take 1 tablet by mouth daily Yes Oliverio Hinds Aylin Michele MD        Social History     Tobacco Use    Smoking status: Never Smoker    Smokeless tobacco: Never Used   Substance Use Topics    Alcohol use: Yes     Alcohol/week: 7.0 standard drinks     Types: 7 Cans of beer per week        Vitals:    06/01/22 1127   BP: 120/64   Pulse: 57   SpO2: 98%   Weight: 200 lb (90.7 kg)     Estimated body mass index is 26.39 kg/m² as calculated from the following:    Height as of 2/14/22: 6' 1\" (1.854 m). Weight as of this encounter: 200 lb (90.7 kg). Physical Exam  Vitals and nursing note reviewed. Constitutional:       Appearance: He is well-developed. HENT:      Head: Normocephalic and atraumatic. Right Ear: External ear normal.      Left Ear: External ear normal.   Neck:      Thyroid: No thyromegaly. Cardiovascular:      Rate and Rhythm: Normal rate and regular rhythm. Heart sounds: No murmur heard. Pulmonary:      Effort: Pulmonary effort is normal.      Breath sounds: Normal breath sounds. No wheezing or rales. Abdominal:      General: Bowel sounds are normal.      Palpations: Abdomen is soft. Tenderness: There is no abdominal tenderness. Skin:     Findings: No rash. Neurological:      Mental Status: He is alert and oriented to person, place, and time. Psychiatric:         Behavior: Behavior normal.         Judgment: Judgment normal.         ASSESSMENT/PLAN:  1. Palpitations  Stable  Increase medication to BID  Keep appointments with specialist.   Answered questions  - CBC  - Comprehensive Metabolic Panel    2. PAULINA (obstructive sleep apnea)  Stable  Continue with medication  Keep appointments with specialist.   Answered questions  - CBC  - Comprehensive Metabolic Panel    3. Mixed hyperlipidemia  Take medication as prescribed. Discussed the need to exercise 30 minutes each day. Monitor diet, avoid fried foods etc... Educated on need to reduce cholesterol in diet and results of poor diet.     - CBC  - Comprehensive Metabolic Panel    4. Leukopenia, unspecified type  Take medication as prescribed. Discussed the need to exercise 30 minutes each day. Monitor diet, avoid fried foods etc... Educated on need to reduce cholesterol in diet and results of poor diet. - CBC  - Comprehensive Metabolic Panel      Return in about 6 months (around 12/1/2022).

## 2022-09-12 NOTE — TELEPHONE ENCOUNTER
Last office visit 6/1/2022     Last written     Next office visit scheduled 12/1/2022    Requested Prescriptions     Pending Prescriptions Disp Refills    metoprolol tartrate (LOPRESSOR) 25 MG tablet [Pharmacy Med Name: METOPROLOL TARTRATE 25 MG TAB] 90 tablet 0     Sig: TAKE ONE TABLET BY MOUTH DAILY

## 2022-12-01 ENCOUNTER — OFFICE VISIT (OUTPATIENT)
Dept: FAMILY MEDICINE CLINIC | Age: 77
End: 2022-12-01
Payer: MEDICARE

## 2022-12-01 VITALS
SYSTOLIC BLOOD PRESSURE: 118 MMHG | BODY MASS INDEX: 25.71 KG/M2 | WEIGHT: 194 LBS | DIASTOLIC BLOOD PRESSURE: 64 MMHG | HEIGHT: 73 IN

## 2022-12-01 DIAGNOSIS — J12.82 PNEUMONIA DUE TO COVID-19 VIRUS: ICD-10-CM

## 2022-12-01 DIAGNOSIS — U07.1 PNEUMONIA DUE TO COVID-19 VIRUS: ICD-10-CM

## 2022-12-01 DIAGNOSIS — E78.2 MIXED HYPERLIPIDEMIA: ICD-10-CM

## 2022-12-01 DIAGNOSIS — R00.2 PALPITATIONS: ICD-10-CM

## 2022-12-01 DIAGNOSIS — M25.571 RIGHT ANKLE PAIN, UNSPECIFIED CHRONICITY: ICD-10-CM

## 2022-12-01 DIAGNOSIS — N43.40 SPERMATOCELE: ICD-10-CM

## 2022-12-01 DIAGNOSIS — G47.33 OSA (OBSTRUCTIVE SLEEP APNEA): Primary | ICD-10-CM

## 2022-12-01 DIAGNOSIS — L40.9 PSORIASIS: ICD-10-CM

## 2022-12-01 PROCEDURE — 99214 OFFICE O/P EST MOD 30 MIN: CPT | Performed by: FAMILY MEDICINE

## 2022-12-01 PROCEDURE — G0008 ADMIN INFLUENZA VIRUS VAC: HCPCS | Performed by: FAMILY MEDICINE

## 2022-12-01 PROCEDURE — 90694 VACC AIIV4 NO PRSRV 0.5ML IM: CPT | Performed by: FAMILY MEDICINE

## 2022-12-01 PROCEDURE — 1123F ACP DISCUSS/DSCN MKR DOCD: CPT | Performed by: FAMILY MEDICINE

## 2022-12-01 RX ORDER — OMEPRAZOLE 20 MG/1
20 CAPSULE, DELAYED RELEASE ORAL
Qty: 90 CAPSULE | Refills: 0 | Status: SHIPPED | OUTPATIENT
Start: 2022-12-01

## 2022-12-01 RX ORDER — TRIAMCINOLONE ACETONIDE 1 MG/G
CREAM TOPICAL
Qty: 30 G | Refills: 1 | Status: SHIPPED | OUTPATIENT
Start: 2022-12-01

## 2022-12-01 RX ORDER — METOPROLOL TARTRATE 50 MG/1
TABLET, FILM COATED ORAL
Qty: 180 TABLET | Refills: 1 | Status: SHIPPED | OUTPATIENT
Start: 2022-12-01

## 2022-12-01 ASSESSMENT — ENCOUNTER SYMPTOMS
NAUSEA: 0
SHORTNESS OF BREATH: 0
DIARRHEA: 0
COUGH: 0
ABDOMINAL PAIN: 0
VOMITING: 0

## 2022-12-01 NOTE — PROGRESS NOTES
2022     Akash Loo (:  1945) is a 78 y.o. male, here for evaluation of the following medical concerns:      Patient presented for follow up concerning multiple medical conditions. NIZOV22- patient is recovering from this and was hospitalized in , still has symptoms associated with possible long haul covid. Palpitations- patient has been on medication for years,has \"skipped beats\", does have dyspnea and fatigue with no chest pain at this times, stable today, taking Lopressor as prescribed, stated had similar symptoms years ago and was started on the medication. 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 Hyperlipidemia- patient has hx of elevated cholesterol     Spermatocele- not a new issues, would like to see a Urologist.     Psoriasis - using medication and following up with dermatologist, still struggles with the \"rash\" at times. PAULINA- wears cpap     Today, chest pain, sob,n , v, or diarrhea. Review of Systems   Constitutional:  Positive for fatigue. Negative for activity change, fever and unexpected weight change. Eyes:  Negative for visual disturbance. Respiratory:  Negative for cough and shortness of breath. Cardiovascular:  Positive for palpitations. Negative for chest pain and leg swelling. Gastrointestinal:  Negative for abdominal pain, diarrhea, nausea and vomiting. Endocrine: Negative for cold intolerance, heat intolerance, polydipsia and polyphagia. Musculoskeletal:  Positive for arthralgias and gait problem. Skin:  Negative for rash. Neurological:  Negative for dizziness, light-headedness and headaches. Psychiatric/Behavioral:  Negative for dysphoric mood. The patient is not nervous/anxious. Prior to Visit Medications    Medication Sig Taking?  Authorizing Provider   metoprolol tartrate (LOPRESSOR) 50 MG tablet TAKE ONE TABLET BID Yes Jean Lynch, DO   omeprazole (PRILOSEC) 20 MG delayed release capsule Take 1 capsule by mouth every morning (before breakfast) Yes Diego Keys, DO   triamcinolone (KENALOG) 0.1 % cream Apply topically 2 times daily. Yes Jean Lynch, DO   metoprolol tartrate (LOPRESSOR) 25 MG tablet TAKE ONE TABLET BY MOUTH DAILY Yes Jean Lynch, DO   aspirin 81 MG chewable tablet Take 1 tablet by mouth daily Yes Anika Pineda MD        Social History     Tobacco Use    Smoking status: Never    Smokeless tobacco: Never   Substance Use Topics    Alcohol use: Yes     Alcohol/week: 7.0 standard drinks     Types: 7 Cans of beer per week        Vitals:    12/01/22 1033   BP: 118/64   Weight: 194 lb (88 kg)   Height: 6' 1\" (1.854 m)     Estimated body mass index is 25.6 kg/m² as calculated from the following:    Height as of this encounter: 6' 1\" (1.854 m). Weight as of this encounter: 194 lb (88 kg). Physical Exam  Vitals and nursing note reviewed. Constitutional:       Appearance: He is well-developed. HENT:      Head: Normocephalic and atraumatic. Right Ear: External ear normal.      Left Ear: External ear normal.   Neck:      Thyroid: No thyromegaly. Cardiovascular:      Rate and Rhythm: Normal rate and regular rhythm. Heart sounds: No murmur heard. Pulmonary:      Effort: Pulmonary effort is normal.      Breath sounds: Normal breath sounds. No wheezing or rales. Abdominal:      General: Bowel sounds are normal.      Palpations: Abdomen is soft. Tenderness: There is no abdominal tenderness. Musculoskeletal:         General: Swelling and tenderness present. Skin:     Findings: No rash. Neurological:      Mental Status: He is alert and oriented to person, place, and time. Psychiatric:         Behavior: Behavior normal.         Judgment: Judgment normal.       ASSESSMENT/PLAN:  1.  Palpitations  Stable  Continue with medication  Keep appointments with specialist.   Gerardo Downs questions   - metoprolol tartrate (LOPRESSOR) 50 MG tablet; TAKE ONE TABLET BID  Dispense: 180 tablet; Refill: 1    2. PAULINA (obstructive sleep apnea)  Stable  Continue with cpap  Answered questions      3. Pneumonia due to COVID-19 virus  Stable  Continue with medication  Answered questions     4. Mixed hyperlipidemia  Take medication as prescribed. Discussed the need to exercise 30 minutes each day. Monitor diet, avoid fried foods etc... Educated on need to reduce cholesterol in diet and results of poor diet. 5. Right ankle pain, unspecified chronicity  Rest  Ice  Elevate  compression    6. Spermatocele  Referred for procedure  Educated on the importance of having this done. Answered questions - AFL - Myles Cummins MD, Urology, [facility]    7. Psoriasis  Stable  Continue with medication  Keep appointments with specialist.   Roseanne Ramires questions       Return in about 6 months (around 6/1/2023).

## 2023-03-01 RX ORDER — OMEPRAZOLE 20 MG/1
CAPSULE, DELAYED RELEASE ORAL
Qty: 90 CAPSULE | Refills: 0 | Status: SHIPPED | OUTPATIENT
Start: 2023-03-01

## 2023-06-01 DIAGNOSIS — R00.2 PALPITATIONS: ICD-10-CM

## 2023-06-05 RX ORDER — OMEPRAZOLE 20 MG/1
CAPSULE, DELAYED RELEASE ORAL
Qty: 90 CAPSULE | Refills: 0 | Status: SHIPPED | OUTPATIENT
Start: 2023-06-05

## 2023-06-05 RX ORDER — METOPROLOL TARTRATE 50 MG/1
TABLET, FILM COATED ORAL
Qty: 180 TABLET | Refills: 1 | Status: SHIPPED | OUTPATIENT
Start: 2023-06-05

## 2023-08-31 RX ORDER — OMEPRAZOLE 20 MG/1
CAPSULE, DELAYED RELEASE ORAL
Qty: 90 CAPSULE | Refills: 0 | Status: SHIPPED | OUTPATIENT
Start: 2023-08-31

## 2023-08-31 NOTE — TELEPHONE ENCOUNTER
Last office visit 12/1/2022       Next office visit scheduled Visit date not found    Requested Prescriptions     Pending Prescriptions Disp Refills    omeprazole (PRILOSEC) 20 MG delayed release capsule [Pharmacy Med Name: OMEPRAZOLE DR 20 MG CAPSULE] 90 capsule 0     Sig: TAKE ONE CAPSULE BY MOUTH EVERY MORNING BEFORE BREAKFAST

## 2023-12-01 DIAGNOSIS — R00.2 PALPITATIONS: ICD-10-CM

## 2023-12-01 RX ORDER — METOPROLOL TARTRATE 50 MG/1
TABLET, FILM COATED ORAL
Qty: 180 TABLET | Refills: 1 | OUTPATIENT
Start: 2023-12-01

## 2023-12-01 RX ORDER — OMEPRAZOLE 20 MG/1
CAPSULE, DELAYED RELEASE ORAL
Qty: 90 CAPSULE | Refills: 0 | OUTPATIENT
Start: 2023-12-01

## 2023-12-01 NOTE — TELEPHONE ENCOUNTER
Last office visit 12/1/2022     Last written      Next office visit scheduled Visit date not found    Requested Prescriptions     Pending Prescriptions Disp Refills    metoprolol tartrate (LOPRESSOR) 50 MG tablet [Pharmacy Med Name: METOPROLOL TARTRATE 50 MG TAB] 180 tablet 1     Sig: TAKE 1 TABLET BY MOUTH TWICE A DAY    omeprazole (PRILOSEC) 20 MG delayed release capsule [Pharmacy Med Name: OMEPRAZOLE DR 20 MG CAPSULE] 90 capsule 0     Sig: TAKE ONE CAPSULE BY MOUTH EVERY MORNING BEFORE BREAKFAST

## 2023-12-07 DIAGNOSIS — H91.90 HEARING LOSS, UNSPECIFIED HEARING LOSS TYPE, UNSPECIFIED LATERALITY: Primary | ICD-10-CM

## 2023-12-26 NOTE — PROGRESS NOTES
Tympanometry: Normal peak pressure and compliance, Type A tympanogram, consistent with normal middle ear function. Acoustic Reflexes: Ipsilateral: Could not maintain seal. Contralateral: Could not maintain seal.    LEFT EAR:  Hearing Sensitivity: Moderate to Profound Mixed hearing loss  Speech Recognition Threshold: 60 dB HL  Word Recognition:Poor (56%), based on NU-6 25-word list at 100m dBHL (Equipment limit) using recorded speech stimuli. Tympanometry: Flat, no peak pressure or compliance, Type B tympanogram, with typical ear canal volume, consistent with middle ear fluid. Acoustic Reflexes: Ipsilateral: Could not maintain seal. Contralateral: Could not maintain seal.    Reliability: Good  Transducer: Headphones    See scanned audiogram dated 12/27/2023  for results. PATIENT EDUCATION:     The following items were discussed with the patient:   - Good Communication Strategies  - Hearing Loss and Hearing Aids  - Tinnitus Management Strategies    - Noise-Induced Hearing Loss and use of Hearing Protection Devices (HPDs)     Educational information was shared in the After Visit Summary. RECOMMENDATIONS:                                                                                                                                                                                                                                                          The following items are recommended based on patient report and results from today's appointment:   - Continue medical follow-up with Yamileth Smith MD.   - Retest hearing as medically indicated and/or sooner if a change in hearing is noted. - If desired, schedule a Hearing Aid Evaluation (HAE) appointment to discuss hearing aid options. - Utilize \"Good Communication Strategies\" as discussed to assist in speech understanding with communication partners.   - Maintain a sound enriched environment to assist in the

## 2023-12-27 ENCOUNTER — PROCEDURE VISIT (OUTPATIENT)
Dept: AUDIOLOGY | Age: 78
End: 2023-12-27
Payer: MEDICARE

## 2023-12-27 ENCOUNTER — OFFICE VISIT (OUTPATIENT)
Dept: ENT CLINIC | Age: 78
End: 2023-12-27
Payer: MEDICARE

## 2023-12-27 VITALS
BODY MASS INDEX: 27.04 KG/M2 | WEIGHT: 204 LBS | HEART RATE: 65 BPM | SYSTOLIC BLOOD PRESSURE: 137 MMHG | HEIGHT: 73 IN | DIASTOLIC BLOOD PRESSURE: 71 MMHG | OXYGEN SATURATION: 98 %

## 2023-12-27 DIAGNOSIS — H90.A32 MIXED CONDUCTIVE AND SENSORINEURAL HEARING LOSS OF LEFT EAR WITH RESTRICTED HEARING OF RIGHT EAR: Primary | ICD-10-CM

## 2023-12-27 DIAGNOSIS — H90.A21 SENSORINEURAL HEARING LOSS (SNHL) OF RIGHT EAR WITH RESTRICTED HEARING OF LEFT EAR: ICD-10-CM

## 2023-12-27 DIAGNOSIS — H92.12 CHRONIC OTORRHEA OF LEFT EAR: ICD-10-CM

## 2023-12-27 DIAGNOSIS — H90.A21 SENSORINEURAL HEARING LOSS (SNHL) OF RIGHT EAR WITH RESTRICTED HEARING OF LEFT EAR: Primary | ICD-10-CM

## 2023-12-27 DIAGNOSIS — J30.0 VASOMOTOR RHINITIS: ICD-10-CM

## 2023-12-27 DIAGNOSIS — H90.A32 MIXED CONDUCTIVE AND SENSORINEURAL HEARING LOSS OF LEFT EAR WITH RESTRICTED HEARING OF RIGHT EAR: ICD-10-CM

## 2023-12-27 DIAGNOSIS — H93.13 TINNITUS OF BOTH EARS: ICD-10-CM

## 2023-12-27 PROCEDURE — 92557 COMPREHENSIVE HEARING TEST: CPT | Performed by: AUDIOLOGIST

## 2023-12-27 PROCEDURE — 99204 OFFICE O/P NEW MOD 45 MIN: CPT | Performed by: OTOLARYNGOLOGY

## 2023-12-27 PROCEDURE — 92567 TYMPANOMETRY: CPT | Performed by: AUDIOLOGIST

## 2023-12-27 PROCEDURE — 1123F ACP DISCUSS/DSCN MKR DOCD: CPT | Performed by: OTOLARYNGOLOGY

## 2023-12-27 RX ORDER — IPRATROPIUM BROMIDE 42 UG/1
2 SPRAY, METERED NASAL 4 TIMES DAILY
Qty: 15 ML | Refills: 3 | Status: SHIPPED | OUTPATIENT
Start: 2023-12-27

## 2023-12-27 NOTE — PATIENT INSTRUCTIONS
hear your music from 3 feet away, it may also be at a level that it can cause damage. 3. Walk away from the sound  If you do not have the ability to wear hearing protection or turn down the volume of the sound, you should do your best to move away from the source of the sound. Sound decreases in intensity as we move further from the source. The sound will decrease by 6 dB for every doubling of distance from the sound source. Exposure to these sounds may cause permanent damage to your hearing.   If you suspect your hearing has changed, it is recommended that you have your hearing tested by your audiologist.

## 2023-12-27 NOTE — PROGRESS NOTES
[address]      Patient Name: Tyson Kebede [facility] Record Number:  [id number]  Primary Care Physician:  Kolton Melgoza DO  Date of Consultation: 12/27/2023    Chief Complaint: Ear issues        HISTORY OF PRESENT ILLNESS  Sherryle Muss is a(n) 70 y.o. male who presents for evaluation of ear issues. The patient says that he has had issues with his ears for a long time. He says he has not heard well on the left side and decades. He does have some drainage from the left ear. He denies any pain whatsoever. He denies history of ear surgery on the left side. It sounds as though he may have seen an urgent throat doctor a long time ago, but they do not do anything according to him. Patient also complains of a runny nose when eating. He thinks it started with COVID. It is very watery. He does not use any medications in the nose at this time            REVIEW OF SYSTEMS  As above    PHYSICAL EXAM  GENERAL: No Acute Distress, Alert and Oriented, no Hoarseness, strong voice  EYES: EOMI, Anti-icteric  HENT:   Head: Normocephalic and atraumatic. Face:  Symmetric, facial nerve intact, no sinus tenderness  Ears: See below  Mouth/Oral Cavity:  normal lips, Uvula is midline, no mucosal lesions, no trismus,  Oropharynx/Larynx:  normal oropharynx,  Nose:Normal external nasal appearance. NECK: Normal range of motion, no thyromegaly, trachea is midline, no lymphadenopathy, no neck masses, no crepitus      PROCEDURE  Bilateral ear exam  The right ear was visualized with the microscope. Tympanic membrane is intact with aerated middle ear    On the left side the patient did not have any mastoid tenderness. The ear canal has some thick purulent drainage that I evacuated. The tympanic membrane appeared to be very thick or lateralized. Difficult to tell the status of the middle ear.       Patient had an audiogram today that showed normal sloping to profound

## 2024-01-11 ENCOUNTER — OFFICE VISIT (OUTPATIENT)
Dept: CARDIOLOGY CLINIC | Age: 79
End: 2024-01-11
Payer: MEDICARE

## 2024-01-11 VITALS
DIASTOLIC BLOOD PRESSURE: 74 MMHG | OXYGEN SATURATION: 96 % | WEIGHT: 204 LBS | SYSTOLIC BLOOD PRESSURE: 138 MMHG | BODY MASS INDEX: 27.04 KG/M2 | HEIGHT: 73 IN | HEART RATE: 78 BPM

## 2024-01-11 DIAGNOSIS — I49.3 PVC (PREMATURE VENTRICULAR CONTRACTION): ICD-10-CM

## 2024-01-11 DIAGNOSIS — R00.0 TACHYCARDIA: Primary | ICD-10-CM

## 2024-01-11 PROCEDURE — 99204 OFFICE O/P NEW MOD 45 MIN: CPT | Performed by: INTERNAL MEDICINE

## 2024-01-11 PROCEDURE — 1123F ACP DISCUSS/DSCN MKR DOCD: CPT | Performed by: INTERNAL MEDICINE

## 2024-01-11 PROCEDURE — 93000 ELECTROCARDIOGRAM COMPLETE: CPT | Performed by: INTERNAL MEDICINE

## 2024-01-11 RX ORDER — ROSUVASTATIN CALCIUM 10 MG/1
10 TABLET, COATED ORAL DAILY
Qty: 90 TABLET | Refills: 1 | Status: SHIPPED | OUTPATIENT
Start: 2024-01-11

## 2024-01-11 NOTE — PROGRESS NOTES
[facility]  Cardiology Consult Note        CC:      Palpitations             HPI:   This is a 78 y.o. male is here for evaluation of palpitations.  He has had this for over 20 years.  He describes this as skipped beats with pauses.  In event monitor showed frequent PVCs several years ago.  He is also had an echocardiogram which shows normal LV size and function.  The patient has no history of chest pain, dizziness.    Patient is a retired  and lives with his wife and has 1 grandchil at home  Past Medical History:   Diagnosis Date    Arrhythmia     Cataracts, bilateral     Sleep apnea       No past surgical history on file.   No family history on file.   Social History     Tobacco Use    Smoking status: Never    Smokeless tobacco: Never   Substance Use Topics    Alcohol use: Yes     Alcohol/week: 7.0 standard drinks of alcohol     Types: 7 Cans of beer per week    Drug use: No      Allergies   Allergen Reactions    Nsaids Nausea Only         Review of Systems -   Constitutional: Negative for weight gain/loss; malaise, fever  Respiratory: Negative for Asthma;  cough and hemoptysis  Cardiovascular: Negative for palpitations,dizziness   Gastrointestinal: Negative for abd.pain; constipation/diarrhea;    Genitourinary: Negative for stones; hematuria; frequency hesitancy  Integumentt: Negative for rash or pruritis  Hematologic/lymphatic: Negative for blood dyscrasia; leukemia/lymphoma  Musculoskeletal: Negative for Connective tissue disease  Neurological:  Negative for Seizure   Behavioral/Psych:Negative for Bipolar disorder, Schizophrenia; Dementia  Endocrine: negative for thyroid, parathyroid disease    No intake or output data in the 24 hours ending 01/11/24 0940    Physical Examination:    /74   Pulse 78   Ht 1.854 m (6' 0.99\")   Wt 92.5 kg (204 lb)   SpO2 96%   BMI 26.92 kg/m²    HEENT:  Face: Atraumatic, Conjunctiva: Pink; non icteric,  Mucous Memb:  Moist, No thyromegaly or

## 2024-01-17 ENCOUNTER — HOSPITAL ENCOUNTER (OUTPATIENT)
Dept: CT IMAGING | Age: 79
Discharge: HOME | End: 2024-01-17
Attending: OTOLARYNGOLOGY
Payer: MEDICARE

## 2024-01-17 DIAGNOSIS — H90.A32 MIXED CONDUCTIVE AND SENSORINEURAL HEARING LOSS OF LEFT EAR WITH RESTRICTED HEARING OF RIGHT EAR: ICD-10-CM

## 2024-01-17 DIAGNOSIS — H92.12 CHRONIC OTORRHEA OF LEFT EAR: ICD-10-CM

## 2024-01-17 PROCEDURE — 70480 CT ORBIT/EAR/FOSSA W/O DYE: CPT

## 2024-01-22 ENCOUNTER — TELEPHONE (OUTPATIENT)
Dept: ENT CLINIC | Age: 79
End: 2024-01-22

## 2024-01-22 NOTE — TELEPHONE ENCOUNTER
----- Message from Vijay Ruggiero MD sent at 1/18/2024 12:53 PM EST -----  Regarding: rogelio moran  I wanted to see him after his Ct

## 2024-01-29 ENCOUNTER — HOSPITAL ENCOUNTER (OUTPATIENT)
Dept: NON INVASIVE DIAGNOSTICS | Age: 79
Discharge: HOME | End: 2024-01-29
Payer: MEDICARE

## 2024-01-29 DIAGNOSIS — R00.0 TACHYCARDIA: ICD-10-CM

## 2024-01-29 DIAGNOSIS — I49.3 PVC (PREMATURE VENTRICULAR CONTRACTION): ICD-10-CM

## 2024-01-29 LAB
ALBUMIN SERPL-MCNC: 4.5 G/DL (ref 3.4–5)
ALBUMIN/GLOB SERPL: 1.9 {RATIO} (ref 1.1–2.2)
ALP SERPL-CCNC: 91 U/L (ref 40–129)
ALT SERPL-CCNC: 13 U/L (ref 10–40)
ANION GAP SERPL CALCULATED.3IONS-SCNC: 12 MMOL/L (ref 3–16)
AST SERPL-CCNC: 14 U/L (ref 15–37)
BILIRUB SERPL-MCNC: 0.7 MG/DL (ref 0–1)
BUN SERPL-MCNC: 13 MG/DL (ref 7–20)
CALCIUM SERPL-MCNC: 9.2 MG/DL (ref 8.3–10.6)
CHLORIDE SERPL-SCNC: 102 MMOL/L (ref 99–110)
CHOLEST SERPL-MCNC: 151 MG/DL (ref 0–199)
CO2 SERPL-SCNC: 28 MMOL/L (ref 21–32)
CREAT SERPL-MCNC: 1.1 MG/DL (ref 0.8–1.3)
GFR SERPLBLD CREATININE-BSD FMLA CKD-EPI: >60 ML/MIN/{1.73_M2}
GLUCOSE SERPL-MCNC: 81 MG/DL (ref 70–99)
HDLC SERPL-MCNC: 44 MG/DL (ref 40–60)
LDLC SERPL CALC-MCNC: 65 MG/DL
POTASSIUM SERPL-SCNC: 4.1 MMOL/L (ref 3.5–5.1)
PROT SERPL-MCNC: 6.9 G/DL (ref 6.4–8.2)
SODIUM SERPL-SCNC: 142 MMOL/L (ref 136–145)
TRIGL SERPL-MCNC: 210 MG/DL (ref 0–150)
VLDLC SERPL CALC-MCNC: 42 MG/DL

## 2024-01-29 PROCEDURE — 93017 CV STRESS TEST TRACING ONLY: CPT

## 2024-01-29 NOTE — PRE-PROCEDURE INSTRUCTIONS
Dr. Martinez's notes in [facility] 1/11/2024 indicates stress nuclear stress test. Writer spoke with Dr. Natan Martinez to clarify stress test order.  Dr. Martinez states may proceed with plain GXT stress test today; patient agreeable.  .es

## 2024-02-01 ENCOUNTER — OFFICE VISIT (OUTPATIENT)
Dept: ENT CLINIC | Age: 79
End: 2024-02-01
Payer: MEDICARE

## 2024-02-01 VITALS
RESPIRATION RATE: 16 BRPM | TEMPERATURE: 96.9 F | SYSTOLIC BLOOD PRESSURE: 134 MMHG | HEIGHT: 72 IN | DIASTOLIC BLOOD PRESSURE: 78 MMHG | OXYGEN SATURATION: 97 % | HEART RATE: 65 BPM | BODY MASS INDEX: 27.77 KG/M2 | WEIGHT: 205 LBS

## 2024-02-01 DIAGNOSIS — H61.899 LESION OF EAR CANAL: ICD-10-CM

## 2024-02-01 DIAGNOSIS — H90.A21 SENSORINEURAL HEARING LOSS (SNHL) OF RIGHT EAR WITH RESTRICTED HEARING OF LEFT EAR: ICD-10-CM

## 2024-02-01 DIAGNOSIS — H90.A32 MIXED CONDUCTIVE AND SENSORINEURAL HEARING LOSS OF LEFT EAR WITH RESTRICTED HEARING OF RIGHT EAR: Primary | ICD-10-CM

## 2024-02-01 PROCEDURE — 1123F ACP DISCUSS/DSCN MKR DOCD: CPT | Performed by: OTOLARYNGOLOGY

## 2024-02-01 PROCEDURE — 99214 OFFICE O/P EST MOD 30 MIN: CPT | Performed by: OTOLARYNGOLOGY

## 2024-02-01 NOTE — PROGRESS NOTES
[facility]  DIVISION OF OTOLARYNGOLOGY- HEAD & NECK SURGERY  Follow up      Patient Name: Mikey Oliva  Medical Record Number:  [id number]  Primary Care Physician:  Diego Keys DO  Date of Consultation: 2/1/2024    Chief Complaint: Left ear issues        Interval History  Patient following up for his left ear.  I saw him on December 27, 2023 and he had some purulent drainage in the ear canal.  He had what appeared to be a very thick or lateralized tympanic membrane.  He had a significant conductive component to the mixed hearing loss on the left side.  He said that the infection seems to be better.  His hearing is not any better.            REVIEW OF SYSTEMS  As above    PHYSICAL EXAM  GENERAL: No Acute Distress, Alert and Oriented, no Hoarseness, strong voice  EYES: EOMI, Anti-icteric  HENT:   Head: Normocephalic and atraumatic.   Face:  Symmetric, facial nerve intact, no sinus tenderness  Ears: See below        RADIOLOGY  Summary of findings:  I reviewed the patient's CT scan.  He has a very thickened medial canal or tympanic membrane with an aerated middle ear and mastoid on the left side.      PROCEDURE  Bilateral ear exam  Right ear was visualized binocular scope.  Tympanic membrane intact with aerated middle.  On the left side the patient has what appears to be a very thick tympanic membrane or scar tissue in the medial canal.        ASSESSMENT/PLAN  1. 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  We discussed management of this.  Certainly a canalplasty is a possibility.  This may improve the conductive component to his hearing loss.  I told him that the primary risk of the surgery is restenosis.  This is actually a pretty high risk for something like this.  He could also just consider hearing aids.  The patient said at this time he is probably going to

## 2024-06-03 DIAGNOSIS — R00.2 PALPITATIONS: ICD-10-CM

## 2024-06-03 RX ORDER — METOPROLOL TARTRATE 50 MG/1
TABLET, FILM COATED ORAL
Qty: 180 TABLET | Refills: 1 | Status: SHIPPED | OUTPATIENT
Start: 2024-06-03

## 2024-11-21 ENCOUNTER — TELEPHONE (OUTPATIENT)
Dept: FAMILY MEDICINE CLINIC | Age: 79
End: 2024-11-21

## 2024-11-21 DIAGNOSIS — Z12.5 SCREENING PSA (PROSTATE SPECIFIC ANTIGEN): ICD-10-CM

## 2024-11-21 DIAGNOSIS — R97.20 ELEVATED PSA: ICD-10-CM

## 2024-11-21 DIAGNOSIS — E78.2 MIXED HYPERLIPIDEMIA: Primary | ICD-10-CM

## 2024-11-21 NOTE — TELEPHONE ENCOUNTER
Patient called stating he spoke with someone 2 days ago and was suppose to receive a call back but hasn't. Patient is requesting orders and appt for blood work. Informed him I would need to call him back as a message was never sent and would need orders from Dr. Keys.     Please call patient back once orders are placed.     Patient asked who he could complain too about the call center- informed him I could transfer him to . He states he will call back to speak with her.

## 2024-11-22 ENCOUNTER — LAB (OUTPATIENT)
Dept: FAMILY MEDICINE CLINIC | Age: 79
End: 2024-11-22
Payer: MEDICARE

## 2024-11-22 DIAGNOSIS — E78.2 MIXED HYPERLIPIDEMIA: ICD-10-CM

## 2024-11-22 DIAGNOSIS — R97.20 ELEVATED PSA: ICD-10-CM

## 2024-11-22 LAB
ALBUMIN SERPL-MCNC: 4.3 G/DL (ref 3.4–5)
ALBUMIN/GLOB SERPL: 1.9 {RATIO} (ref 1.1–2.2)
ALP SERPL-CCNC: 76 U/L (ref 40–129)
ALT SERPL-CCNC: 15 U/L (ref 10–40)
ANION GAP SERPL CALCULATED.3IONS-SCNC: 10 MMOL/L (ref 3–16)
AST SERPL-CCNC: 23 U/L (ref 15–37)
BASOPHILS # BLD: 0 K/UL (ref 0–0.2)
BASOPHILS NFR BLD: 0.6 %
BILIRUB SERPL-MCNC: 0.9 MG/DL (ref 0–1)
BUN SERPL-MCNC: 16 MG/DL (ref 7–20)
CALCIUM SERPL-MCNC: 9.3 MG/DL (ref 8.3–10.6)
CHLORIDE SERPL-SCNC: 104 MMOL/L (ref 99–110)
CHOLEST SERPL-MCNC: 238 MG/DL (ref 0–199)
CO2 SERPL-SCNC: 27 MMOL/L (ref 21–32)
CREAT SERPL-MCNC: 1.2 MG/DL (ref 0.8–1.3)
DEPRECATED RDW RBC AUTO: 14.5 % (ref 12.4–15.4)
EOSINOPHIL # BLD: 0.1 K/UL (ref 0–0.6)
EOSINOPHIL NFR BLD: 2 %
GFR SERPLBLD CREATININE-BSD FMLA CKD-EPI: 62 ML/MIN/{1.73_M2}
GLUCOSE SERPL-MCNC: 88 MG/DL (ref 70–99)
HCT VFR BLD AUTO: 47.7 % (ref 40.5–52.5)
HDLC SERPL-MCNC: 41 MG/DL (ref 40–60)
HGB BLD-MCNC: 15.9 G/DL (ref 13.5–17.5)
LDLC SERPL CALC-MCNC: 158 MG/DL
LYMPHOCYTES # BLD: 1.3 K/UL (ref 1–5.1)
LYMPHOCYTES NFR BLD: 27.7 %
MCH RBC QN AUTO: 29.8 PG (ref 26–34)
MCHC RBC AUTO-ENTMCNC: 33.4 G/DL (ref 31–36)
MCV RBC AUTO: 89.2 FL (ref 80–100)
MONOCYTES # BLD: 0.5 K/UL (ref 0–1.3)
MONOCYTES NFR BLD: 10.2 %
NEUTROPHILS # BLD: 2.8 K/UL (ref 1.7–7.7)
NEUTROPHILS NFR BLD: 59.5 %
PLATELET # BLD AUTO: 157 K/UL (ref 135–450)
PMV BLD AUTO: 7.4 FL (ref 5–10.5)
POTASSIUM SERPL-SCNC: 4.5 MMOL/L (ref 3.5–5.1)
PROT SERPL-MCNC: 6.6 G/DL (ref 6.4–8.2)
PSA SERPL DL<=0.01 NG/ML-MCNC: 9.07 NG/ML (ref 0–4)
RBC # BLD AUTO: 5.35 M/UL (ref 4.2–5.9)
SODIUM SERPL-SCNC: 141 MMOL/L (ref 136–145)
TRIGL SERPL-MCNC: 193 MG/DL (ref 0–150)
TSH SERPL DL<=0.005 MIU/L-ACNC: 2.44 UIU/ML (ref 0.27–4.2)
VLDLC SERPL CALC-MCNC: 39 MG/DL
WBC # BLD AUTO: 4.8 K/UL (ref 4–11)

## 2024-11-22 PROCEDURE — 36415 COLL VENOUS BLD VENIPUNCTURE: CPT | Performed by: FAMILY MEDICINE

## 2024-11-22 NOTE — PROGRESS NOTES
Patient here for fasting blood work. Blood work drawn from left AC without complications    1sst  1lav

## 2024-11-27 ENCOUNTER — OFFICE VISIT (OUTPATIENT)
Dept: FAMILY MEDICINE CLINIC | Age: 79
End: 2024-11-27

## 2024-11-27 VITALS
WEIGHT: 204 LBS | BODY MASS INDEX: 27.63 KG/M2 | DIASTOLIC BLOOD PRESSURE: 68 MMHG | HEIGHT: 72 IN | SYSTOLIC BLOOD PRESSURE: 118 MMHG

## 2024-11-27 DIAGNOSIS — J96.01 ACUTE RESPIRATORY FAILURE WITH HYPOXIA: ICD-10-CM

## 2024-11-27 DIAGNOSIS — R00.2 PALPITATIONS: ICD-10-CM

## 2024-11-27 DIAGNOSIS — Z00.00 MEDICARE ANNUAL WELLNESS VISIT, SUBSEQUENT: ICD-10-CM

## 2024-11-27 DIAGNOSIS — R97.20 ELEVATED PSA: Primary | ICD-10-CM

## 2024-11-27 SDOH — ECONOMIC STABILITY: FOOD INSECURITY: WITHIN THE PAST 12 MONTHS, THE FOOD YOU BOUGHT JUST DIDN'T LAST AND YOU DIDN'T HAVE MONEY TO GET MORE.: NEVER TRUE

## 2024-11-27 SDOH — ECONOMIC STABILITY: FOOD INSECURITY: WITHIN THE PAST 12 MONTHS, YOU WORRIED THAT YOUR FOOD WOULD RUN OUT BEFORE YOU GOT MONEY TO BUY MORE.: NEVER TRUE

## 2024-11-27 SDOH — ECONOMIC STABILITY: INCOME INSECURITY: HOW HARD IS IT FOR YOU TO PAY FOR THE VERY BASICS LIKE FOOD, HOUSING, MEDICAL CARE, AND HEATING?: NOT HARD AT ALL

## 2024-11-27 ASSESSMENT — PATIENT HEALTH QUESTIONNAIRE - PHQ9
SUM OF ALL RESPONSES TO PHQ QUESTIONS 1-9: 0
1. LITTLE INTEREST OR PLEASURE IN DOING THINGS: NOT AT ALL
SUM OF ALL RESPONSES TO PHQ9 QUESTIONS 1 & 2: 0
2. FEELING DOWN, DEPRESSED OR HOPELESS: NOT AT ALL

## 2024-11-27 ASSESSMENT — LIFESTYLE VARIABLES
HOW MANY STANDARD DRINKS CONTAINING ALCOHOL DO YOU HAVE ON A TYPICAL DAY: PATIENT DOES NOT DRINK
HOW OFTEN DO YOU HAVE A DRINK CONTAINING ALCOHOL: NEVER

## 2024-11-27 NOTE — PROGRESS NOTES
2024     Mikey Oliva (:  1945) is a 79 y.o. male, here for evaluation of the following medical concerns:    HPI  Patient presented for follow up concerning multiple medical conditions.      Covid19- patient is recovering from this and was hospitalized in , still has symptoms associated with possible long haul covid.      Palpitations- patient has been on medication for years,has \"skipped beats\", does have dyspnea and fatigue with no chest pain at this times, stable today, taking Lopressor as prescribed, stated had similar symptoms years ago and was started on the medication.          Hyperlipidemia- patient has hx of elevated cholesterol      Elevated PSA- not a new issues, would like to see a Urologist.      Psoriasis - using medication and following up with dermatologist, still struggles with the \"rash\" at times.      PAULINA- wears cpap, seems to tolerate it well.      Today, chest pain, sob,n , v, or diarrhea.  Review of Systems   Constitutional:  Positive for fatigue. Negative for activity change, fever and unexpected weight change.   HENT:  Negative for congestion, ear pain, facial swelling, hearing loss, rhinorrhea, sinus pressure, sore throat and trouble swallowing.    Eyes:  Negative for discharge and visual disturbance.   Respiratory:  Negative for cough, chest tightness, shortness of breath and wheezing.    Cardiovascular:  Positive for palpitations. Negative for chest pain and leg swelling.   Gastrointestinal:  Negative for abdominal pain, anal bleeding, blood in stool, diarrhea, nausea and vomiting.   Endocrine: Negative for cold intolerance, heat intolerance, polydipsia and polyphagia.   Genitourinary:  Negative for decreased urine volume, dysuria, frequency, genital sores, penile discharge, penile pain, testicular pain and urgency.   Musculoskeletal:  Negative for arthralgias.   Skin:  Negative for rash.   Allergic/Immunologic: Negative for food allergies.

## 2024-12-01 PROBLEM — J12.82 PNEUMONIA DUE TO COVID-19 VIRUS: Status: RESOLVED | Noted: 2022-01-14 | Resolved: 2024-12-01

## 2024-12-01 PROBLEM — U07.1 PNEUMONIA DUE TO COVID-19 VIRUS: Status: RESOLVED | Noted: 2022-01-14 | Resolved: 2024-12-01

## 2024-12-01 PROBLEM — J96.01 ACUTE RESPIRATORY FAILURE WITH HYPOXIA: Status: RESOLVED | Noted: 2024-11-27 | Resolved: 2024-12-01

## 2024-12-01 RX ORDER — METOPROLOL TARTRATE 50 MG
TABLET ORAL
Qty: 180 TABLET | Refills: 2 | Status: SHIPPED | OUTPATIENT
Start: 2024-12-01

## 2024-12-20 ENCOUNTER — TELEPHONE (OUTPATIENT)
Dept: FAMILY MEDICINE CLINIC | Age: 79
End: 2024-12-20

## 2024-12-20 NOTE — TELEPHONE ENCOUNTER
Pt called wanting antibiotic for sinus infection. He has post nasal drip, pain around eyes, clear mucous, started last night.

## 2025-07-31 LAB — PSA SERPL DL<=0.01 NG/ML-MCNC: 0.28 NG/ML (ref 0–4)

## 2025-08-26 DIAGNOSIS — R00.2 PALPITATIONS: ICD-10-CM

## 2025-08-26 RX ORDER — METOPROLOL TARTRATE 50 MG
50 TABLET ORAL 2 TIMES DAILY
Qty: 180 TABLET | Refills: 2 | Status: SHIPPED | OUTPATIENT
Start: 2025-08-26